# Patient Record
Sex: FEMALE | Race: WHITE | NOT HISPANIC OR LATINO | Employment: OTHER | URBAN - NONMETROPOLITAN AREA
[De-identification: names, ages, dates, MRNs, and addresses within clinical notes are randomized per-mention and may not be internally consistent; named-entity substitution may affect disease eponyms.]

---

## 2023-01-25 PROBLEM — J01.90 ACUTE SINUSITIS: Status: ACTIVE | Noted: 2023-01-25

## 2023-01-25 PROBLEM — N39.0 URINARY TRACT INFECTION: Status: ACTIVE | Noted: 2023-01-25

## 2023-01-25 PROBLEM — R10.13 ABDOMINAL PAIN, EPIGASTRIC: Status: ACTIVE | Noted: 2023-01-25

## 2023-01-25 PROBLEM — E78.00 HYPERCHOLESTEROLEMIA: Status: ACTIVE | Noted: 2023-01-25

## 2023-01-25 PROBLEM — D51.0 VITAMIN B12 DEFICIENCY ANEMIA DUE TO INTRINSIC FACTOR DEFICIENCY: Status: ACTIVE | Noted: 2023-01-25

## 2023-01-25 PROBLEM — F41.9 ANXIETY DISORDER: Status: ACTIVE | Noted: 2023-01-25

## 2023-01-25 PROBLEM — M79.605 BILATERAL LEG AND FOOT PAIN: Status: ACTIVE | Noted: 2023-01-25

## 2023-01-25 PROBLEM — M85.80 OSTEOPENIA: Status: ACTIVE | Noted: 2023-01-25

## 2023-01-25 PROBLEM — J02.9 ACUTE PHARYNGITIS: Status: ACTIVE | Noted: 2023-01-25

## 2023-01-25 PROBLEM — M81.0 OSTEOPOROSIS: Status: ACTIVE | Noted: 2023-01-25

## 2023-01-25 PROBLEM — M79.672 BILATERAL LEG AND FOOT PAIN: Status: ACTIVE | Noted: 2023-01-25

## 2023-01-25 PROBLEM — K62.89 RECTAL PAIN: Status: ACTIVE | Noted: 2023-01-25

## 2023-01-25 PROBLEM — T78.40XA ALLERGY: Status: ACTIVE | Noted: 2023-01-25

## 2023-01-25 PROBLEM — Z98.890 H/O OVARIAN CYSTECTOMY: Status: ACTIVE | Noted: 2023-01-25

## 2023-01-25 PROBLEM — K63.5 HYPERPLASTIC COLON POLYP: Status: ACTIVE | Noted: 2023-01-25

## 2023-01-25 PROBLEM — N90.9 DISORDER OF VULVA: Status: ACTIVE | Noted: 2023-01-25

## 2023-01-25 PROBLEM — K57.30 DIVERTICULOSIS OF COLON WITHOUT DIVERTICULITIS: Status: ACTIVE | Noted: 2023-01-25

## 2023-01-25 PROBLEM — R35.0 URINARY FREQUENCY: Status: ACTIVE | Noted: 2023-01-25

## 2023-01-25 PROBLEM — L72.3 INFECTED SEBACEOUS CYST: Status: ACTIVE | Noted: 2023-01-25

## 2023-01-25 PROBLEM — I10 BENIGN ESSENTIAL HYPERTENSION: Status: ACTIVE | Noted: 2023-01-25

## 2023-01-25 PROBLEM — Z87.42 H/O OVARIAN CYSTECTOMY: Status: ACTIVE | Noted: 2023-01-25

## 2023-01-25 PROBLEM — K64.8 INTERNAL HEMORRHOIDS WITH COMPLICATION: Status: ACTIVE | Noted: 2023-01-25

## 2023-01-25 PROBLEM — K21.9 GERD (GASTROESOPHAGEAL REFLUX DISEASE): Status: ACTIVE | Noted: 2023-01-25

## 2023-01-25 PROBLEM — H93.19 TINNITUS: Status: ACTIVE | Noted: 2023-01-25

## 2023-01-25 PROBLEM — K82.4 GALL BLADDER POLYP: Status: ACTIVE | Noted: 2023-01-25

## 2023-01-25 PROBLEM — K20.90 ESOPHAGITIS: Status: ACTIVE | Noted: 2023-01-25

## 2023-01-25 PROBLEM — R07.9 CHEST PAIN: Status: ACTIVE | Noted: 2023-01-25

## 2023-01-25 PROBLEM — M25.512 LEFT SHOULDER PAIN: Status: ACTIVE | Noted: 2023-01-25

## 2023-01-25 PROBLEM — M75.102 LEFT ROTATOR CUFF TEAR: Status: ACTIVE | Noted: 2023-01-25

## 2023-01-25 PROBLEM — E03.9 HYPOTHYROIDISM: Status: ACTIVE | Noted: 2023-01-25

## 2023-01-25 PROBLEM — M79.604 BILATERAL LEG AND FOOT PAIN: Status: ACTIVE | Noted: 2023-01-25

## 2023-01-25 PROBLEM — K64.4 RESIDUAL HEMORRHOIDAL SKIN TAGS: Status: ACTIVE | Noted: 2023-01-25

## 2023-01-25 PROBLEM — M21.619 ASYMPTOMATIC BUNION: Status: ACTIVE | Noted: 2023-01-25

## 2023-01-25 PROBLEM — B37.31 VULVOVAGINAL CANDIDIASIS: Status: ACTIVE | Noted: 2023-01-25

## 2023-01-25 PROBLEM — K22.89 ESOPHAGEAL THICKENING: Status: ACTIVE | Noted: 2023-01-25

## 2023-01-25 PROBLEM — E11.9 CONTROLLED DIABETES MELLITUS (MULTI): Status: ACTIVE | Noted: 2023-01-25

## 2023-01-25 PROBLEM — L30.9 DERMATITIS, ECZEMATOID: Status: ACTIVE | Noted: 2023-01-25

## 2023-01-25 PROBLEM — L98.9 SKIN LESION OF BACK: Status: ACTIVE | Noted: 2023-01-25

## 2023-01-25 PROBLEM — M70.61 GREATER TROCHANTERIC BURSITIS OF RIGHT HIP: Status: ACTIVE | Noted: 2023-01-25

## 2023-01-25 PROBLEM — F17.200 NICOTINE DEPENDENCE: Status: ACTIVE | Noted: 2023-01-25

## 2023-01-25 PROBLEM — R10.32 ABDOMINAL PAIN, LLQ (LEFT LOWER QUADRANT): Status: ACTIVE | Noted: 2023-01-25

## 2023-01-25 PROBLEM — J30.9 ALLERGIC RHINITIS: Status: ACTIVE | Noted: 2023-01-25

## 2023-01-25 PROBLEM — M79.671 BILATERAL LEG AND FOOT PAIN: Status: ACTIVE | Noted: 2023-01-25

## 2023-01-25 PROBLEM — K64.4 EXTERNAL HEMORRHOIDS: Status: ACTIVE | Noted: 2023-01-25

## 2023-01-25 PROBLEM — M25.551 HIP PAIN, RIGHT: Status: ACTIVE | Noted: 2023-01-25

## 2023-01-25 PROBLEM — R93.89 ENDOMETRIAL THICKENING ON ULTRASOUND: Status: ACTIVE | Noted: 2023-01-25

## 2023-01-25 PROBLEM — R92.8 ABNORMAL MAMMOGRAM: Status: ACTIVE | Noted: 2023-01-25

## 2023-01-25 PROBLEM — E55.9 VITAMIN D DEFICIENCY: Status: ACTIVE | Noted: 2023-01-25

## 2023-01-25 PROBLEM — N23 KIDNEY PAIN: Status: ACTIVE | Noted: 2023-01-25

## 2023-01-25 PROBLEM — L08.9 INFECTED SEBACEOUS CYST: Status: ACTIVE | Noted: 2023-01-25

## 2023-01-25 PROBLEM — R13.13 CRICOPHARYNGEAL DYSPHAGIA: Status: ACTIVE | Noted: 2023-01-25

## 2023-01-25 PROBLEM — N63.20 LEFT BREAST MASS: Status: ACTIVE | Noted: 2023-01-25

## 2023-01-25 PROBLEM — J20.9 ACUTE BRONCHITIS: Status: ACTIVE | Noted: 2023-01-25

## 2023-01-25 PROBLEM — J02.0 STREP THROAT: Status: ACTIVE | Noted: 2023-01-25

## 2023-01-25 PROBLEM — R91.1 LUNG NODULE SEEN ON IMAGING STUDY: Status: ACTIVE | Noted: 2023-01-25

## 2023-01-25 PROBLEM — R93.5 ABNORMAL ULTRASOUND OF ABDOMEN: Status: ACTIVE | Noted: 2023-01-25

## 2023-01-25 PROBLEM — T50.905A ADVERSE REACTION TO DRUG, INITIAL ENCOUNTER: Status: ACTIVE | Noted: 2023-01-25

## 2023-01-25 PROBLEM — M19.90 ARTHRITIS: Status: ACTIVE | Noted: 2023-01-25

## 2023-01-25 PROBLEM — R51.9 HEADACHE: Status: ACTIVE | Noted: 2023-01-25
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
## 2023-01-25 RX ORDER — ACETAMINOPHEN 500 MG
1 TABLET ORAL DAILY
COMMUNITY
Start: 2022-02-10

## 2023-01-25 RX ORDER — LEVOTHYROXINE SODIUM 50 UG/1
1 TABLET ORAL DAILY
COMMUNITY
Start: 2018-07-19 | End: 2023-03-09 | Stop reason: SDUPTHER

## 2023-01-25 RX ORDER — BLOOD-GLUCOSE METER
KIT MISCELLANEOUS
COMMUNITY
Start: 2014-08-18 | End: 2024-05-29

## 2023-01-25 RX ORDER — LOSARTAN POTASSIUM 25 MG/1
1 TABLET ORAL DAILY
COMMUNITY
Start: 2015-04-30 | End: 2023-03-09 | Stop reason: SDUPTHER

## 2023-01-25 RX ORDER — MULTIVITAMIN
1 TABLET ORAL DAILY
COMMUNITY
Start: 2021-11-11

## 2023-01-25 RX ORDER — METFORMIN HYDROCHLORIDE 500 MG/1
1 TABLET ORAL DAILY
COMMUNITY
Start: 2012-04-26 | End: 2023-03-09 | Stop reason: SDUPTHER

## 2023-03-09 ENCOUNTER — OFFICE VISIT (OUTPATIENT)
Dept: PRIMARY CARE | Facility: CLINIC | Age: 78
End: 2023-03-09
Payer: MEDICARE

## 2023-03-09 VITALS
HEART RATE: 75 BPM | SYSTOLIC BLOOD PRESSURE: 146 MMHG | DIASTOLIC BLOOD PRESSURE: 60 MMHG | OXYGEN SATURATION: 98 % | WEIGHT: 149.4 LBS | BODY MASS INDEX: 27.33 KG/M2

## 2023-03-09 DIAGNOSIS — E11.9 CONTROLLED TYPE 2 DIABETES MELLITUS WITHOUT COMPLICATION, WITHOUT LONG-TERM CURRENT USE OF INSULIN (MULTI): Primary | ICD-10-CM

## 2023-03-09 DIAGNOSIS — E03.8 HYPOTHYROIDISM DUE TO HASHIMOTO'S THYROIDITIS: ICD-10-CM

## 2023-03-09 DIAGNOSIS — E06.3 HYPOTHYROIDISM DUE TO HASHIMOTO'S THYROIDITIS: ICD-10-CM

## 2023-03-09 DIAGNOSIS — E55.9 VITAMIN D DEFICIENCY: ICD-10-CM

## 2023-03-09 DIAGNOSIS — I10 BENIGN ESSENTIAL HYPERTENSION: ICD-10-CM

## 2023-03-09 PROCEDURE — 3078F DIAST BP <80 MM HG: CPT | Performed by: INTERNAL MEDICINE

## 2023-03-09 PROCEDURE — 99214 OFFICE O/P EST MOD 30 MIN: CPT | Performed by: INTERNAL MEDICINE

## 2023-03-09 PROCEDURE — 1160F RVW MEDS BY RX/DR IN RCRD: CPT | Performed by: INTERNAL MEDICINE

## 2023-03-09 PROCEDURE — 1159F MED LIST DOCD IN RCRD: CPT | Performed by: INTERNAL MEDICINE

## 2023-03-09 PROCEDURE — 3077F SYST BP >= 140 MM HG: CPT | Performed by: INTERNAL MEDICINE

## 2023-03-09 RX ORDER — LOSARTAN POTASSIUM 25 MG/1
25 TABLET ORAL DAILY
Qty: 90 TABLET | Refills: 0 | Status: SHIPPED | OUTPATIENT
Start: 2023-03-09 | End: 2023-06-12 | Stop reason: SDUPTHER

## 2023-03-09 RX ORDER — LEVOTHYROXINE SODIUM 50 UG/1
50 TABLET ORAL
Qty: 90 TABLET | Refills: 0 | Status: SHIPPED | OUTPATIENT
Start: 2023-03-09 | End: 2023-06-12 | Stop reason: SDUPTHER

## 2023-03-09 RX ORDER — METFORMIN HYDROCHLORIDE 500 MG/1
500 TABLET ORAL
Qty: 90 TABLET | Refills: 0 | Status: SHIPPED | OUTPATIENT
Start: 2023-03-09 | End: 2023-06-12 | Stop reason: SDUPTHER

## 2023-03-09 ASSESSMENT — ENCOUNTER SYMPTOMS
DEPRESSION: 1
OCCASIONAL FEELINGS OF UNSTEADINESS: 0
LOSS OF SENSATION IN FEET: 0

## 2023-03-09 NOTE — PROGRESS NOTES
Subjective   Patient ID: Luz Elena Bloom is a 77 y.o. female who presents for Follow-up (3 month medical management ).  HPI  Hypertension stable on rx no side effects  DM stable on rx no side effects   yesterday am  Hypothyroidism stable on rx no side effects  Vit  D stable on rx no side effects  Diet / exercise rev'd    Review of Systems   All other systems reviewed and are negative.      Objective   Physical Exam  Vitals reviewed.   Constitutional:       General: She is not in acute distress.     Appearance: Normal appearance. She is well-developed. She is not diaphoretic.   HENT:      Head: Normocephalic and atraumatic.      Nose: Nose normal.   Eyes:      General: No scleral icterus.     Pupils: Pupils are equal, round, and reactive to light.   Neck:      Thyroid: No thyromegaly.      Vascular: No JVD.   Cardiovascular:      Rate and Rhythm: Normal rate and regular rhythm.      Heart sounds: Normal heart sounds. No murmur heard.     No friction rub. No gallop.   Pulmonary:      Effort: Pulmonary effort is normal. No respiratory distress.      Breath sounds: Normal breath sounds. No wheezing or rales.   Chest:      Chest wall: No tenderness.   Abdominal:      General: Bowel sounds are normal. There is no distension.      Palpations: Abdomen is soft. There is no mass.      Tenderness: There is no abdominal tenderness. There is no rebound.   Musculoskeletal:         General: Normal range of motion.      Cervical back: Normal range of motion and neck supple.   Lymphadenopathy:      Cervical: No cervical adenopathy.   Skin:     General: Skin is warm and dry.   Neurological:      Mental Status: She is alert and oriented to person, place, and time.      Deep Tendon Reflexes: Reflexes normal.         Assessment/Plan   Problem List Items Addressed This Visit          Circulatory    Benign essential hypertension     Stable on rx no side effects         Relevant Medications    losartan (Cozaar) 25 mg tablet        Endocrine/Metabolic    Controlled diabetes mellitus (CMS/HCC) - Primary     Check blood sugars daily  Continue meds as are           Relevant Medications    metFORMIN (Glucophage) 500 mg tablet    Hypothyroidism     Stable on rx no side effects         Relevant Medications    levothyroxine (Synthroid, Levoxyl) 50 mcg tablet    Vitamin D deficiency     Stable on rx no side effects             Follow up 3 months

## 2023-04-26 ENCOUNTER — TELEPHONE (OUTPATIENT)
Dept: PRIMARY CARE | Facility: CLINIC | Age: 78
End: 2023-04-26
Payer: MEDICARE

## 2023-04-26 NOTE — TELEPHONE ENCOUNTER
Patient called just wanting to let  know she is having a root canal tomorrow 4/27/23, she is on clindamycin 150 mg, she is just wanting to let us know in case she has a reaction.

## 2023-06-08 PROBLEM — N81.10 FEMALE BLADDER PROLAPSE: Status: ACTIVE | Noted: 2023-06-08

## 2023-06-12 ENCOUNTER — OFFICE VISIT (OUTPATIENT)
Dept: PRIMARY CARE | Facility: CLINIC | Age: 78
End: 2023-06-12
Payer: MEDICARE

## 2023-06-12 VITALS
HEART RATE: 62 BPM | DIASTOLIC BLOOD PRESSURE: 62 MMHG | OXYGEN SATURATION: 98 % | BODY MASS INDEX: 27.4 KG/M2 | SYSTOLIC BLOOD PRESSURE: 126 MMHG | WEIGHT: 149.8 LBS

## 2023-06-12 DIAGNOSIS — E11.9 CONTROLLED TYPE 2 DIABETES MELLITUS WITHOUT COMPLICATION, WITHOUT LONG-TERM CURRENT USE OF INSULIN (MULTI): Primary | ICD-10-CM

## 2023-06-12 DIAGNOSIS — E03.8 HYPOTHYROIDISM DUE TO HASHIMOTO'S THYROIDITIS: ICD-10-CM

## 2023-06-12 DIAGNOSIS — Z12.31 ENCOUNTER FOR SCREENING MAMMOGRAM FOR MALIGNANT NEOPLASM OF BREAST: ICD-10-CM

## 2023-06-12 DIAGNOSIS — I10 BENIGN ESSENTIAL HYPERTENSION: ICD-10-CM

## 2023-06-12 DIAGNOSIS — E06.3 HYPOTHYROIDISM DUE TO HASHIMOTO'S THYROIDITIS: ICD-10-CM

## 2023-06-12 DIAGNOSIS — F17.210 CIGARETTE NICOTINE DEPENDENCE WITHOUT COMPLICATION: ICD-10-CM

## 2023-06-12 PROCEDURE — 3074F SYST BP LT 130 MM HG: CPT | Performed by: INTERNAL MEDICINE

## 2023-06-12 PROCEDURE — 1159F MED LIST DOCD IN RCRD: CPT | Performed by: INTERNAL MEDICINE

## 2023-06-12 PROCEDURE — 3078F DIAST BP <80 MM HG: CPT | Performed by: INTERNAL MEDICINE

## 2023-06-12 PROCEDURE — 1160F RVW MEDS BY RX/DR IN RCRD: CPT | Performed by: INTERNAL MEDICINE

## 2023-06-12 PROCEDURE — 99214 OFFICE O/P EST MOD 30 MIN: CPT | Performed by: INTERNAL MEDICINE

## 2023-06-12 RX ORDER — METFORMIN HYDROCHLORIDE 500 MG/1
500 TABLET ORAL
Qty: 90 TABLET | Refills: 0 | Status: SHIPPED | OUTPATIENT
Start: 2023-06-12 | End: 2023-09-12 | Stop reason: SDUPTHER

## 2023-06-12 RX ORDER — LEVOTHYROXINE SODIUM 50 UG/1
50 TABLET ORAL
Qty: 90 TABLET | Refills: 0 | Status: SHIPPED | OUTPATIENT
Start: 2023-06-12 | End: 2023-09-12 | Stop reason: SDUPTHER

## 2023-06-12 RX ORDER — LOSARTAN POTASSIUM 25 MG/1
25 TABLET ORAL DAILY
Qty: 90 TABLET | Refills: 0 | Status: SHIPPED | OUTPATIENT
Start: 2023-06-12 | End: 2023-09-12 | Stop reason: SDUPTHER

## 2023-06-12 NOTE — PROGRESS NOTES
Subjective   Patient ID: Luz Elena Bloom is a 77 y.o. female who presents for Diabetes Mellitus, Hypothyroidism, and Vitamin D Deficiency.  Vitamin Deficiency    Routine follow up    No complaints    DM type II on therapy stable no side effects   this a.m.    Hypertension stable on therapy no side effects    Hypothyroidism stable on therapy no side effects    Vitamin D deficiency stable on therapy no side effects    Diet and exercise reviewed    Review of Systems   All other systems reviewed and are negative.      Objective   /62   Pulse 62   Wt 67.9 kg (149 lb 12.8 oz)   SpO2 98%   BMI 27.40 kg/m²   Lab Results   Component Value Date    WBC 7.0 11/03/2022    HGB 13.8 11/03/2022    HCT 41.4 11/03/2022     11/03/2022    CHOL 178 11/03/2022    TRIG 94 11/03/2022    HDL 54.0 11/03/2022    ALT 9 11/03/2022    AST 12 11/03/2022     11/03/2022    K 4.1 11/03/2022     (H) 11/03/2022    CREATININE 0.79 11/03/2022    BUN 18 11/03/2022    CO2 26 11/03/2022    TSH 2.97 11/03/2022    HGBA1C 6.6 (A) 11/03/2022           Physical Exam  Vitals reviewed.   Constitutional:       Appearance: Normal appearance. She is normal weight.   HENT:      Head: Normocephalic and atraumatic.      Mouth/Throat:      Pharynx: No posterior oropharyngeal erythema.   Eyes:      General: No scleral icterus.     Conjunctiva/sclera: Conjunctivae normal.      Pupils: Pupils are equal, round, and reactive to light.   Cardiovascular:      Rate and Rhythm: Normal rate and regular rhythm.      Heart sounds: Normal heart sounds.   Pulmonary:      Effort: No respiratory distress.      Breath sounds: No wheezing.   Abdominal:      General: Abdomen is flat. Bowel sounds are normal. There is no distension.      Palpations: Abdomen is soft. There is no mass.      Tenderness: There is no abdominal tenderness. There is no rebound.   Musculoskeletal:         General: Normal range of motion.      Cervical back: Normal range of  motion and neck supple.   Skin:     General: Skin is warm and dry.   Neurological:      General: No focal deficit present.      Mental Status: She is alert and oriented to person, place, and time. Mental status is at baseline.   Psychiatric:         Mood and Affect: Mood normal.         Behavior: Behavior normal.         Thought Content: Thought content normal.         Judgment: Judgment normal.       Problem List Items Addressed This Visit          Circulatory    Benign essential hypertension    Relevant Medications    losartan (Cozaar) 25 mg tablet       Endocrine/Metabolic    Controlled diabetes mellitus (CMS/HCC)    Relevant Medications    metFORMIN (Glucophage) 500 mg tablet    Hypothyroidism    Relevant Medications    levothyroxine (Synthroid, Levoxyl) 50 mcg tablet     Other Visit Diagnoses       Encounter for screening mammogram for malignant neoplasm of breast    -  Primary    Relevant Orders    BI mammo bilateral screening tomosynthesis          Assessment/Plan     DM type II on therapy stable no side effects   this a.m.    Hypertension stable on therapy no side effects    Hypothyroidism stable on therapy no side effects    Vitamin D deficiency stable on therapy no side effects    Nicotine addiction discussed    Diet and exercise reviewed    Mammogram ordered    Medicare physical 12-22    Follow up 3 months

## 2023-09-06 RX ORDER — DOXYCYCLINE 100 MG/1
100 CAPSULE ORAL 2 TIMES DAILY
COMMUNITY
Start: 2023-04-10 | End: 2023-09-12 | Stop reason: ALTCHOICE

## 2023-09-12 ENCOUNTER — OFFICE VISIT (OUTPATIENT)
Dept: PRIMARY CARE | Facility: CLINIC | Age: 78
End: 2023-09-12
Payer: MEDICARE

## 2023-09-12 VITALS
DIASTOLIC BLOOD PRESSURE: 62 MMHG | WEIGHT: 151 LBS | OXYGEN SATURATION: 98 % | SYSTOLIC BLOOD PRESSURE: 136 MMHG | HEART RATE: 75 BPM | BODY MASS INDEX: 27.62 KG/M2

## 2023-09-12 DIAGNOSIS — R53.83 OTHER FATIGUE: ICD-10-CM

## 2023-09-12 DIAGNOSIS — E06.3 HYPOTHYROIDISM DUE TO HASHIMOTO'S THYROIDITIS: ICD-10-CM

## 2023-09-12 DIAGNOSIS — E78.00 HYPERCHOLESTEREMIA: ICD-10-CM

## 2023-09-12 DIAGNOSIS — E55.9 VITAMIN D DEFICIENCY: ICD-10-CM

## 2023-09-12 DIAGNOSIS — M25.511 ACUTE PAIN OF RIGHT SHOULDER: ICD-10-CM

## 2023-09-12 DIAGNOSIS — F17.210 CIGARETTE NICOTINE DEPENDENCE WITHOUT COMPLICATION: ICD-10-CM

## 2023-09-12 DIAGNOSIS — E03.8 HYPOTHYROIDISM DUE TO HASHIMOTO'S THYROIDITIS: ICD-10-CM
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
## 2023-09-12 DIAGNOSIS — E11.9 CONTROLLED TYPE 2 DIABETES MELLITUS WITHOUT COMPLICATION, WITHOUT LONG-TERM CURRENT USE OF INSULIN (MULTI): ICD-10-CM

## 2023-09-12 DIAGNOSIS — I10 BENIGN ESSENTIAL HYPERTENSION: ICD-10-CM

## 2023-09-12 PROCEDURE — 3078F DIAST BP <80 MM HG: CPT | Performed by: INTERNAL MEDICINE

## 2023-09-12 PROCEDURE — 3075F SYST BP GE 130 - 139MM HG: CPT | Performed by: INTERNAL MEDICINE

## 2023-09-12 PROCEDURE — 99406 BEHAV CHNG SMOKING 3-10 MIN: CPT | Performed by: INTERNAL MEDICINE

## 2023-09-12 PROCEDURE — 1160F RVW MEDS BY RX/DR IN RCRD: CPT | Performed by: INTERNAL MEDICINE

## 2023-09-12 PROCEDURE — 99215 OFFICE O/P EST HI 40 MIN: CPT | Performed by: INTERNAL MEDICINE

## 2023-09-12 PROCEDURE — 1159F MED LIST DOCD IN RCRD: CPT | Performed by: INTERNAL MEDICINE

## 2023-09-12 RX ORDER — METFORMIN HYDROCHLORIDE 500 MG/1
500 TABLET ORAL
Qty: 90 TABLET | Refills: 0 | Status: SHIPPED | OUTPATIENT
Start: 2023-09-12 | End: 2023-12-26 | Stop reason: SDUPTHER

## 2023-09-12 RX ORDER — LEVOTHYROXINE SODIUM 50 UG/1
50 TABLET ORAL
Qty: 90 TABLET | Refills: 0 | Status: SHIPPED | OUTPATIENT
Start: 2023-09-12 | End: 2023-12-26 | Stop reason: SDUPTHER

## 2023-09-12 RX ORDER — LOSARTAN POTASSIUM 25 MG/1
25 TABLET ORAL DAILY
Qty: 90 TABLET | Refills: 0 | Status: SHIPPED | OUTPATIENT
Start: 2023-09-12 | End: 2023-12-26 | Stop reason: SDUPTHER

## 2023-09-12 ASSESSMENT — ENCOUNTER SYMPTOMS
OCCASIONAL FEELINGS OF UNSTEADINESS: 0
DEPRESSION: 0
LOSS OF SENSATION IN FEET: 0

## 2023-09-12 NOTE — PATIENT INSTRUCTIONS

## 2023-09-12 NOTE — PROGRESS NOTES
Subjective   Patient ID: Luz Elena Bloom is a 77 y.o. female who presents for Med Management, Diabetes Mellitus, Hypothyroidism, Vitamin D Deficiency, Fall (8-, fell at the last step and missed it, fell on right shoulder), and Shoulder Pain (Right side, Hurts to lift, pain depends on what way she turns it.  Pain goes into elbow and sometimes her hand.).  Vitamin Deficiency  Fall    Shoulder Pain     Routine follow up    Fall going to basement carrying things, not using railing and in the dark  Rt shoulder bruised FROM    DM type II on therapy stable no side effects   yesterday a.m.     Hypertension stable on therapy no side effects     Hypothyroidism stable on therapy no side effects     Vitamin D deficiency stable on therapy no side effects     Nicotine addiction discussed     Diet and exercise reviewed     Mammogram ordered     Medicare physical 12-22    Review of Systems   All other systems reviewed and are negative.      Objective   /62   Pulse 75   Wt 68.5 kg (151 lb)   SpO2 98%   BMI 27.62 kg/m²   Lab Results   Component Value Date    WBC 7.0 11/03/2022    HGB 13.8 11/03/2022    HCT 41.4 11/03/2022     11/03/2022    CHOL 178 11/03/2022    TRIG 94 11/03/2022    HDL 54.0 11/03/2022    ALT 9 11/03/2022    AST 12 11/03/2022     11/03/2022    K 4.1 11/03/2022     (H) 11/03/2022    CREATININE 0.79 11/03/2022    BUN 18 11/03/2022    CO2 26 11/03/2022    TSH 2.97 11/03/2022    HGBA1C 6.6 (A) 11/03/2022           Physical Exam  Vitals reviewed.   Constitutional:       Appearance: Normal appearance. She is normal weight.   HENT:      Head: Normocephalic and atraumatic.      Mouth/Throat:      Pharynx: No posterior oropharyngeal erythema.   Eyes:      General: No scleral icterus.     Conjunctiva/sclera: Conjunctivae normal.      Pupils: Pupils are equal, round, and reactive to light.   Cardiovascular:      Rate and Rhythm: Normal rate and regular rhythm.      Heart sounds:  Normal heart sounds.   Pulmonary:      Effort: No respiratory distress.      Breath sounds: No wheezing.   Abdominal:      General: Abdomen is flat. Bowel sounds are normal. There is no distension.      Palpations: Abdomen is soft. There is no mass.      Tenderness: There is no abdominal tenderness. There is no rebound.   Musculoskeletal:         General: Normal range of motion.      Cervical back: Normal range of motion and neck supple.   Skin:     General: Skin is warm and dry.   Neurological:      General: No focal deficit present.      Mental Status: She is alert and oriented to person, place, and time. Mental status is at baseline.   Psychiatric:         Mood and Affect: Mood normal.         Behavior: Behavior normal.         Thought Content: Thought content normal.         Judgment: Judgment normal.         Problem List Items Addressed This Visit          Cardiac and Vasculature    Benign essential hypertension    Relevant Medications    losartan (Cozaar) 25 mg tablet       Endocrine/Metabolic    Controlled diabetes mellitus (CMS/HCC)    Relevant Medications    metFORMIN (Glucophage) 500 mg tablet    Other Relevant Orders    Hemoglobin A1C    Albumin , Urine Random    Hypothyroidism    Relevant Medications    levothyroxine (Synthroid, Levoxyl) 50 mcg tablet    Vitamin D deficiency    Relevant Orders    Vitamin D 25-Hydroxy,Total (for eval of Vitamin D levels)       Musculoskeletal and Injuries    Fall - Primary       Tobacco    Nicotine dependence     Other Visit Diagnoses       Acute pain of right shoulder        Hypercholesteremia        Relevant Orders    Comprehensive Metabolic Panel    Lipid Panel    TSH with reflex to Free T4 if abnormal    Other fatigue        Relevant Orders    CBC and Auto Differential    BMI 26.0-26.9,adult              Assessment/Plan       Routine follow up    Fall going to basement carrying things, not using railing and in the dark  Rt shoulder bruised FROM  Follow  Pt declined  PT    DM type II on therapy stable no side effects   yesterday a.m.     Hypertension stable on therapy no side effects     Hypothyroidism stable on therapy no side effects     Vitamin D deficiency stable on therapy no side effects    smoking  cessation discussed   I spent more  than 3 min but less than 10 min counselling pt about need for smoking / tobacco cessation  and how I can support efforts when pt is ready to quit      Diet and exercise reviewed     Mammogram ordered     Medicare physical 12-22    Mammogram 7-22 ordered not done  Dexa n/a  Colonoscopy n/a  CT chest lung cancer screening on follow up  GYN n/a  immunizations rev'd  BMI 26.3    Patient was identified as a fall risk. Risk prevention instructions provided    Check labs , urine before appt  Do mammogram    Eyes, feet on follow up    Follow up 3 months / medicare physical 12-23 / EKG

## 2023-12-04 ENCOUNTER — LAB (OUTPATIENT)
Dept: LAB | Facility: LAB | Age: 78
End: 2023-12-04
Payer: MEDICARE

## 2023-12-04 DIAGNOSIS — R53.83 OTHER FATIGUE: ICD-10-CM

## 2023-12-04 DIAGNOSIS — E78.00 HYPERCHOLESTEREMIA: ICD-10-CM

## 2023-12-04 DIAGNOSIS — E11.9 CONTROLLED TYPE 2 DIABETES MELLITUS WITHOUT COMPLICATION, WITHOUT LONG-TERM CURRENT USE OF INSULIN (MULTI): ICD-10-CM

## 2023-12-04 DIAGNOSIS — E55.9 VITAMIN D DEFICIENCY: ICD-10-CM

## 2023-12-04 LAB
25(OH)D3 SERPL-MCNC: 48 NG/ML (ref 30–100)
ALBUMIN SERPL BCP-MCNC: 3.9 G/DL (ref 3.4–5)
ALP SERPL-CCNC: 57 U/L (ref 33–136)
ALT SERPL W P-5'-P-CCNC: 14 U/L (ref 7–45)
ANION GAP SERPL CALC-SCNC: 13 MMOL/L (ref 10–20)
AST SERPL W P-5'-P-CCNC: 15 U/L (ref 9–39)
BASOPHILS # BLD AUTO: 0.04 X10*3/UL (ref 0–0.1)
BASOPHILS NFR BLD AUTO: 0.6 %
BILIRUB SERPL-MCNC: 0.3 MG/DL (ref 0–1.2)
BUN SERPL-MCNC: 18 MG/DL (ref 6–23)
CALCIUM SERPL-MCNC: 9.1 MG/DL (ref 8.6–10.3)
CHLORIDE SERPL-SCNC: 107 MMOL/L (ref 98–107)
CHOLEST SERPL-MCNC: 190 MG/DL (ref 0–199)
CHOLESTEROL/HDL RATIO: 3.8
CO2 SERPL-SCNC: 24 MMOL/L (ref 21–32)
CREAT SERPL-MCNC: 0.83 MG/DL (ref 0.5–1.05)
CREAT UR-MCNC: 141.3 MG/DL (ref 20–320)
EOSINOPHIL # BLD AUTO: 0.04 X10*3/UL (ref 0–0.4)
EOSINOPHIL NFR BLD AUTO: 0.6 %
ERYTHROCYTE [DISTWIDTH] IN BLOOD BY AUTOMATED COUNT: 13.4 % (ref 11.5–14.5)
EST. AVERAGE GLUCOSE BLD GHB EST-MCNC: 151 MG/DL
GFR SERPL CREATININE-BSD FRML MDRD: 73 ML/MIN/1.73M*2
GLUCOSE SERPL-MCNC: 174 MG/DL (ref 74–99)
HBA1C MFR BLD: 6.9 %
HCT VFR BLD AUTO: 40.8 % (ref 36–46)
HDLC SERPL-MCNC: 49.6 MG/DL
HGB BLD-MCNC: 13.5 G/DL (ref 12–16)
IMM GRANULOCYTES # BLD AUTO: 0.01 X10*3/UL (ref 0–0.5)
IMM GRANULOCYTES NFR BLD AUTO: 0.2 % (ref 0–0.9)
LDLC SERPL CALC-MCNC: 117 MG/DL
LYMPHOCYTES # BLD AUTO: 1.67 X10*3/UL (ref 0.8–3)
LYMPHOCYTES NFR BLD AUTO: 27 %
MCH RBC QN AUTO: 32.5 PG (ref 26–34)
MCHC RBC AUTO-ENTMCNC: 33.1 G/DL (ref 32–36)
MCV RBC AUTO: 98 FL (ref 80–100)
MICROALBUMIN UR-MCNC: 19.9 MG/L
MICROALBUMIN/CREAT UR: 14.1 UG/MG CREAT
MONOCYTES # BLD AUTO: 0.77 X10*3/UL (ref 0.05–0.8)
MONOCYTES NFR BLD AUTO: 12.4 %
NEUTROPHILS # BLD AUTO: 3.66 X10*3/UL (ref 1.6–5.5)
NEUTROPHILS NFR BLD AUTO: 59.2 %
NON HDL CHOLESTEROL: 140 MG/DL (ref 0–149)
NRBC BLD-RTO: 0 /100 WBCS (ref 0–0)
PLATELET # BLD AUTO: 200 X10*3/UL (ref 150–450)
POTASSIUM SERPL-SCNC: 4.2 MMOL/L (ref 3.5–5.3)
PROT SERPL-MCNC: 6.7 G/DL (ref 6.4–8.2)
RBC # BLD AUTO: 4.15 X10*6/UL (ref 4–5.2)
SODIUM SERPL-SCNC: 140 MMOL/L (ref 136–145)
TRIGL SERPL-MCNC: 117 MG/DL (ref 0–149)
TSH SERPL-ACNC: 3.67 MIU/L (ref 0.44–3.98)
VLDL: 23 MG/DL (ref 0–40)
WBC # BLD AUTO: 6.2 X10*3/UL (ref 4.4–11.3)

## 2023-12-04 PROCEDURE — 82043 UR ALBUMIN QUANTITATIVE: CPT

## 2023-12-04 PROCEDURE — 82306 VITAMIN D 25 HYDROXY: CPT

## 2023-12-04 PROCEDURE — 82570 ASSAY OF URINE CREATININE: CPT

## 2023-12-04 PROCEDURE — 36415 COLL VENOUS BLD VENIPUNCTURE: CPT

## 2023-12-04 PROCEDURE — 83036 HEMOGLOBIN GLYCOSYLATED A1C: CPT

## 2023-12-12 ENCOUNTER — OFFICE VISIT (OUTPATIENT)
Dept: PRIMARY CARE | Facility: CLINIC | Age: 78
End: 2023-12-12
Payer: MEDICARE

## 2023-12-12 VITALS
OXYGEN SATURATION: 97 % | BODY MASS INDEX: 27.12 KG/M2 | DIASTOLIC BLOOD PRESSURE: 72 MMHG | WEIGHT: 147.4 LBS | HEART RATE: 69 BPM | SYSTOLIC BLOOD PRESSURE: 136 MMHG | HEIGHT: 62 IN

## 2023-12-12 DIAGNOSIS — E11.69 TYPE 2 DIABETES MELLITUS WITH OTHER SPECIFIED COMPLICATION, WITHOUT LONG-TERM CURRENT USE OF INSULIN (MULTI): ICD-10-CM

## 2023-12-12 DIAGNOSIS — Z71.2 ENCOUNTER TO DISCUSS TEST RESULTS: ICD-10-CM

## 2023-12-12 DIAGNOSIS — Z13.9 SCREENING DUE: ICD-10-CM

## 2023-12-12 DIAGNOSIS — I10 BENIGN ESSENTIAL HYPERTENSION: ICD-10-CM

## 2023-12-12 DIAGNOSIS — Z00.00 ENCOUNTER FOR SUBSEQUENT ANNUAL WELLNESS VISIT (AWV) IN MEDICARE PATIENT: Primary | ICD-10-CM

## 2023-12-12 DIAGNOSIS — F17.210 CIGARETTE NICOTINE DEPENDENCE WITHOUT COMPLICATION: ICD-10-CM

## 2023-12-12 PROCEDURE — 3075F SYST BP GE 130 - 139MM HG: CPT | Performed by: INTERNAL MEDICINE

## 2023-12-12 PROCEDURE — G0446 INTENS BEHAVE THER CARDIO DX: HCPCS | Performed by: INTERNAL MEDICINE

## 2023-12-12 PROCEDURE — G0439 PPPS, SUBSEQ VISIT: HCPCS | Performed by: INTERNAL MEDICINE

## 2023-12-12 PROCEDURE — 93000 ELECTROCARDIOGRAM COMPLETE: CPT | Performed by: INTERNAL MEDICINE

## 2023-12-12 PROCEDURE — 1160F RVW MEDS BY RX/DR IN RCRD: CPT | Performed by: INTERNAL MEDICINE

## 2023-12-12 PROCEDURE — 3078F DIAST BP <80 MM HG: CPT | Performed by: INTERNAL MEDICINE

## 2023-12-12 PROCEDURE — 1170F FXNL STATUS ASSESSED: CPT | Performed by: INTERNAL MEDICINE

## 2023-12-12 PROCEDURE — 1159F MED LIST DOCD IN RCRD: CPT | Performed by: INTERNAL MEDICINE

## 2023-12-12 ASSESSMENT — PATIENT HEALTH QUESTIONNAIRE - PHQ9
SUM OF ALL RESPONSES TO PHQ9 QUESTIONS 1 AND 2: 0
2. FEELING DOWN, DEPRESSED OR HOPELESS: NOT AT ALL
1. LITTLE INTEREST OR PLEASURE IN DOING THINGS: NOT AT ALL

## 2023-12-12 ASSESSMENT — ACTIVITIES OF DAILY LIVING (ADL)
MANAGING_FINANCES: INDEPENDENT
DOING_HOUSEWORK: INDEPENDENT
BATHING: INDEPENDENT
TAKING_MEDICATION: INDEPENDENT
DRESSING: INDEPENDENT
GROCERY_SHOPPING: INDEPENDENT

## 2023-12-12 ASSESSMENT — ENCOUNTER SYMPTOMS
DEPRESSION: 0
LOSS OF SENSATION IN FEET: 0
OCCASIONAL FEELINGS OF UNSTEADINESS: 0

## 2023-12-13 NOTE — PROGRESS NOTES
"Subjective   Reason for Visit: Luz Elena Bloom is an 78 y.o. female here for a Medicare Wellness visit.     Past Medical, Surgical, and Family History reviewed and updated in chart.    Reviewed all medications by prescribing practitioner or clinical pharmacist (such as prescriptions, OTCs, herbal therapies and supplements) and documented in the medical record.    HPI    Medicare wellness    Labs rev'd    Mammogram 7-23  Dexa n/a  Colonoscopy n/a  CT chest lung cancer screening n/a  GYN n/a  immunizations rev'd  BMI 26.9      Patient Care Team:  Mary Jo Brooks MD as PCP - General  [facility] Jo Brooks [facility]     Review of Systems   All other systems reviewed and are negative.      Objective   Vitals:  /72   Pulse 69   Ht 1.575 m (5' 2\")   Wt 66.9 kg (147 lb 6.4 oz)   SpO2 97%   BMI 26.96 kg/m²       Physical Exam  Vitals reviewed.   Constitutional:       Appearance: Normal appearance. She is normal weight.   HENT:      Head: Normocephalic and atraumatic.      Mouth/Throat:      Pharynx: No posterior oropharyngeal erythema.   Eyes:      General: No scleral icterus.     Conjunctiva/sclera: Conjunctivae normal.      Pupils: Pupils are equal, round, and reactive to light.   Cardiovascular:      Rate and Rhythm: Normal rate and regular rhythm.      Heart sounds: Normal heart sounds.   Pulmonary:      Effort: No respiratory distress.      Breath sounds: No wheezing.   Abdominal:      General: Abdomen is flat. Bowel sounds are normal. There is no distension.      Palpations: Abdomen is soft. There is no mass.      Tenderness: There is no abdominal tenderness. There is no rebound.   Musculoskeletal:         General: Normal range of motion.      Cervical back: Normal range of motion and neck supple.   Skin:     General: Skin is warm and dry.   Neurological:      General: No focal deficit present.      Mental Status: She is alert and oriented to person, place, and time. Mental " status is at baseline.   Psychiatric:         Mood and Affect: Mood normal.         Behavior: Behavior normal.         Thought Content: Thought content normal.         Judgment: Judgment normal.         Assessment/Plan   Problem List Items Addressed This Visit          Cardiac and Vasculature    Benign essential hypertension    Relevant Orders    ECG 12 Lead (Completed)       Tobacco    Nicotine dependence     Other Visit Diagnoses       Encounter for subsequent annual wellness visit (AWV) in Medicare patient    -  Primary    Encounter to discuss test results        Type 2 diabetes mellitus with other specified complication, without long-term current use of insulin (CMS/[facility])        Screening due        Relevant Orders    CT cardiac scoring wo IV contrast    BMI 26.0-26.9,adult                 Medicare wellness    Labs rev'd    Mammogram 7-23  Dexa n/a  Colonoscopy n/a  CT chest lung cancer screening n/a  GYN n/a  immunizations rev'd  BMI 26.9    Hypertension stable on rx no side effects  EKG ok 12-23  I spent 15 minutes face-to-face with this individual discussing their cardiovascular risk and behavioral therapies of nutritional choices, exercise, and elimination of habits contributing to risk. We agreed on plan how they may be able to reduce their current cardiovascular risk.  Patient 10 year cardiac risk estimate calculates :   59.5  %   CT cardiac score ordered    Nicotine abuse discussed    DM 2   HBA1C 6.9  12-23    Diet / exercise rev'd    Follow up 3 months

## 2023-12-26 DIAGNOSIS — E03.8 HYPOTHYROIDISM DUE TO HASHIMOTO'S THYROIDITIS: ICD-10-CM

## 2023-12-26 DIAGNOSIS — E11.9 CONTROLLED TYPE 2 DIABETES MELLITUS WITHOUT COMPLICATION, WITHOUT LONG-TERM CURRENT USE OF INSULIN (MULTI): ICD-10-CM

## 2023-12-26 DIAGNOSIS — E06.3 HYPOTHYROIDISM DUE TO HASHIMOTO'S THYROIDITIS: ICD-10-CM

## 2023-12-26 DIAGNOSIS — I10 BENIGN ESSENTIAL HYPERTENSION: ICD-10-CM

## 2023-12-27 RX ORDER — METFORMIN HYDROCHLORIDE 500 MG/1
500 TABLET ORAL
Qty: 90 TABLET | Refills: 0 | Status: SHIPPED | OUTPATIENT
Start: 2023-12-27 | End: 2024-03-25 | Stop reason: SDUPTHER

## 2023-12-27 RX ORDER — LOSARTAN POTASSIUM 25 MG/1
25 TABLET ORAL DAILY
Qty: 90 TABLET | Refills: 0 | Status: SHIPPED | OUTPATIENT
Start: 2023-12-27 | End: 2024-03-25 | Stop reason: SDUPTHER

## 2023-12-27 RX ORDER — LEVOTHYROXINE SODIUM 50 UG/1
50 TABLET ORAL
Qty: 90 TABLET | Refills: 0 | Status: SHIPPED | OUTPATIENT
Start: 2023-12-27 | End: 2024-03-25 | Stop reason: SDUPTHER

## 2024-03-13 ENCOUNTER — APPOINTMENT (OUTPATIENT)
Dept: PRIMARY CARE | Facility: CLINIC | Age: 79
End: 2024-03-13
Payer: MEDICARE

## 2024-03-25 ENCOUNTER — OFFICE VISIT (OUTPATIENT)
Dept: PRIMARY CARE | Facility: CLINIC | Age: 79
End: 2024-03-25
Payer: MEDICARE

## 2024-03-25 VITALS
TEMPERATURE: 97.6 F | DIASTOLIC BLOOD PRESSURE: 62 MMHG | OXYGEN SATURATION: 98 % | SYSTOLIC BLOOD PRESSURE: 142 MMHG | BODY MASS INDEX: 28.17 KG/M2 | HEART RATE: 105 BPM | WEIGHT: 154 LBS

## 2024-03-25 DIAGNOSIS — B35.1 ONYCHOMYCOSIS: ICD-10-CM

## 2024-03-25 DIAGNOSIS — H91.90 HEARING LOSS, UNSPECIFIED HEARING LOSS TYPE, UNSPECIFIED LATERALITY: ICD-10-CM

## 2024-03-25 DIAGNOSIS — E06.3 HYPOTHYROIDISM DUE TO HASHIMOTO'S THYROIDITIS: ICD-10-CM

## 2024-03-25 DIAGNOSIS — E03.8 HYPOTHYROIDISM DUE TO HASHIMOTO'S THYROIDITIS: ICD-10-CM

## 2024-03-25 DIAGNOSIS — E11.9 CONTROLLED TYPE 2 DIABETES MELLITUS WITHOUT COMPLICATION, WITHOUT LONG-TERM CURRENT USE OF INSULIN (MULTI): Primary | ICD-10-CM

## 2024-03-25 DIAGNOSIS — I10 BENIGN ESSENTIAL HYPERTENSION: ICD-10-CM

## 2024-03-25 DIAGNOSIS — J30.9 ALLERGIC RHINITIS, UNSPECIFIED SEASONALITY, UNSPECIFIED TRIGGER: ICD-10-CM

## 2024-03-25 PROBLEM — E11.69 TYPE 2 DIABETES MELLITUS WITH OTHER SPECIFIED COMPLICATION, WITHOUT LONG-TERM CURRENT USE OF INSULIN (MULTI): Status: ACTIVE | Noted: 2023-01-25

## 2024-03-25 PROCEDURE — 1160F RVW MEDS BY RX/DR IN RCRD: CPT | Performed by: INTERNAL MEDICINE

## 2024-03-25 PROCEDURE — 3078F DIAST BP <80 MM HG: CPT | Performed by: INTERNAL MEDICINE

## 2024-03-25 PROCEDURE — 1036F TOBACCO NON-USER: CPT | Performed by: INTERNAL MEDICINE

## 2024-03-25 PROCEDURE — 1159F MED LIST DOCD IN RCRD: CPT | Performed by: INTERNAL MEDICINE

## 2024-03-25 PROCEDURE — 99214 OFFICE O/P EST MOD 30 MIN: CPT | Performed by: INTERNAL MEDICINE

## 2024-03-25 PROCEDURE — 3077F SYST BP >= 140 MM HG: CPT | Performed by: INTERNAL MEDICINE

## 2024-03-25 RX ORDER — METFORMIN HYDROCHLORIDE 500 MG/1
500 TABLET ORAL
Qty: 90 TABLET | Refills: 0 | Status: SHIPPED | OUTPATIENT
Start: 2024-03-25 | End: 2024-06-23

## 2024-03-25 RX ORDER — CICLOPIROX 80 MG/ML
SOLUTION TOPICAL NIGHTLY
Qty: 6.6 ML | Refills: 5 | Status: SHIPPED | OUTPATIENT
Start: 2024-03-25

## 2024-03-25 RX ORDER — LOSARTAN POTASSIUM 50 MG/1
50 TABLET ORAL DAILY
Qty: 90 TABLET | Refills: 0 | Status: SHIPPED | OUTPATIENT
Start: 2024-03-25 | End: 2024-06-23

## 2024-03-25 RX ORDER — LEVOTHYROXINE SODIUM 50 UG/1
50 TABLET ORAL
Qty: 90 TABLET | Refills: 0 | Status: SHIPPED | OUTPATIENT
Start: 2024-03-25 | End: 2024-06-23

## 2024-03-25 ASSESSMENT — PATIENT HEALTH QUESTIONNAIRE - PHQ9
SUM OF ALL RESPONSES TO PHQ9 QUESTIONS 1 AND 2: 0
1. LITTLE INTEREST OR PLEASURE IN DOING THINGS: NOT AT ALL
2. FEELING DOWN, DEPRESSED OR HOPELESS: NOT AT ALL

## 2024-03-25 NOTE — PROGRESS NOTES
Subjective   Patient ID: Luz Elena Bloom is a 78 y.o. female who presents for Follow-up (3 mth. ), Foot Pain (Cramping. Looks like the big toenail is coming off on her right foot.), Hearing Problem (Trouble hearing in right ear.), and Eye Problem (Having mucus come out of them in the morning. ).  Hearing Problem    Eye Problem     Routine follow up    Onchomycosis big toe rt  Penlac as directed  Left foot bunion  Podiatry consult    Hearing loss  Cerumen right  ENT consult    Allergies  Claritin daily    Hypertension stable on rx no side effects  EKG ok 12-23  Increase cozaar 50 mg daily  CT cardiac score ordered / not done  Statin on follow up     Nicotine quit 12-23     DM 2  stable on rx no side effects  HBA1C 6.9  12-23     Diet / exercise rev'd    Review of Systems   All other systems reviewed and are negative.      Objective   /62   Pulse 105   Temp 36.4 °C (97.6 °F)   Wt 69.9 kg (154 lb)   SpO2 98%   BMI 28.17 kg/m²   Lab Results   Component Value Date    WBC 6.2 12/04/2023    HGB 13.5 12/04/2023    HCT 40.8 12/04/2023     12/04/2023    CHOL 190 12/04/2023    TRIG 117 12/04/2023    HDL 49.6 12/04/2023    ALT 14 12/04/2023    AST 15 12/04/2023     12/04/2023    K 4.2 12/04/2023     12/04/2023    CREATININE 0.83 12/04/2023    BUN 18 12/04/2023    CO2 24 12/04/2023    TSH 3.67 12/04/2023    HGBA1C 6.9 (H) 12/04/2023           Physical Exam  Vitals reviewed.   Constitutional:       Appearance: Normal appearance. She is normal weight.   HENT:      Head: Normocephalic and atraumatic.      Comments: Rt canal wax  Lt ok     Mouth/Throat:      Pharynx: No posterior oropharyngeal erythema.   Eyes:      General: No scleral icterus.     Conjunctiva/sclera: Conjunctivae normal.      Pupils: Pupils are equal, round, and reactive to light.   Cardiovascular:      Rate and Rhythm: Normal rate and regular rhythm.      Heart sounds: Normal heart sounds.   Pulmonary:      Effort: No respiratory  distress.      Breath sounds: No wheezing.   Abdominal:      General: Abdomen is flat. Bowel sounds are normal. There is no distension.      Palpations: Abdomen is soft. There is no mass.      Tenderness: There is no abdominal tenderness. There is no rebound.   Musculoskeletal:         General: Normal range of motion.      Cervical back: Normal range of motion and neck supple.   Skin:     General: Skin is warm and dry.   Neurological:      General: No focal deficit present.      Mental Status: She is alert and oriented to person, place, and time. Mental status is at baseline.   Psychiatric:         Mood and Affect: Mood normal.         Behavior: Behavior normal.         Thought Content: Thought content normal.         Judgment: Judgment normal.         Problem List Items Addressed This Visit             ICD-10-CM    Allergic rhinitis J30.9    Benign essential hypertension I10    Relevant Medications    losartan (Cozaar) 50 mg tablet    Type 2 diabetes mellitus with other specified complication, without long-term current use of insulin (CMS/[facility]) - Primary E11.69    Relevant Medications    metFORMIN (Glucophage) 500 mg tablet    Hypothyroidism E03.9    Relevant Medications    levothyroxine (Synthroid, Levoxyl) 50 mcg tablet     Other Visit Diagnoses         Codes    Onychomycosis     B35.1    Relevant Medications    ciclopirox (Penlac) 8 % solution    Hearing loss, unspecified hearing loss type, unspecified laterality     H91.90    Relevant Orders    Referral to ENT    BMI 28.0-28.9,adult     Z68.28          Assessment/Plan     Onchomycosis big toe rt  Penlac as directed  Left foot bunion  Podiatry consult    Hearing loss  Cerumen right  ENT consult    Allergies  Claritin daily    Hypertension stable on rx no side effects  EKG ok 12-23  Increase cozaar 50 mg daily  CT cardiac score ordered / not done  Statin on follow up     Nicotine quit 12-23     DM 2  stable on rx no side effects  HBA1C 6.9  12-23     Diet /  exercise rev'd    Mammogram 7-23  Dexa n/a  Colonoscopy n/a  CT chest lung cancer screening n/a  GYN n/a  immunizations rev'd  BMI 28.1    Follow up 3 months

## 2024-05-28 DIAGNOSIS — E11.69 TYPE 2 DIABETES MELLITUS WITH OTHER SPECIFIED COMPLICATION, WITHOUT LONG-TERM CURRENT USE OF INSULIN (MULTI): ICD-10-CM

## 2024-05-29 RX ORDER — BLOOD-GLUCOSE METER
KIT MISCELLANEOUS
Qty: 200 STRIP | Refills: 0 | Status: SHIPPED | OUTPATIENT
Start: 2024-05-29

## 2024-05-29 RX ORDER — LANCETS
EACH MISCELLANEOUS
Qty: 200 EACH | Refills: 0 | Status: SHIPPED | OUTPATIENT
Start: 2024-05-29 | End: 2024-06-04 | Stop reason: SDUPTHER

## 2024-06-03 DIAGNOSIS — E11.69 TYPE 2 DIABETES MELLITUS WITH OTHER SPECIFIED COMPLICATION, WITHOUT LONG-TERM CURRENT USE OF INSULIN (MULTI): ICD-10-CM

## 2024-06-04 RX ORDER — LANCETS 28 GAUGE
EACH MISCELLANEOUS
Qty: 100 EACH | Refills: 0 | Status: SHIPPED | OUTPATIENT
Start: 2024-06-04

## 2024-06-10 RX ORDER — TERBINAFINE HYDROCHLORIDE 250 MG/1
250 TABLET ORAL DAILY
COMMUNITY

## 2024-06-25 ENCOUNTER — APPOINTMENT (OUTPATIENT)
Dept: PRIMARY CARE | Facility: CLINIC | Age: 79
End: 2024-06-25
Payer: MEDICARE

## 2024-06-25 VITALS
DIASTOLIC BLOOD PRESSURE: 60 MMHG | WEIGHT: 153 LBS | SYSTOLIC BLOOD PRESSURE: 148 MMHG | TEMPERATURE: 98.2 F | OXYGEN SATURATION: 98 % | BODY MASS INDEX: 27.98 KG/M2 | HEART RATE: 51 BPM

## 2024-06-25 DIAGNOSIS — Z12.31 ENCOUNTER FOR SCREENING MAMMOGRAM FOR MALIGNANT NEOPLASM OF BREAST: ICD-10-CM

## 2024-06-25 DIAGNOSIS — E11.9 CONTROLLED TYPE 2 DIABETES MELLITUS WITHOUT COMPLICATION, WITHOUT LONG-TERM CURRENT USE OF INSULIN (MULTI): Primary | ICD-10-CM

## 2024-06-25 DIAGNOSIS — I10 BENIGN ESSENTIAL HYPERTENSION: ICD-10-CM

## 2024-06-25 DIAGNOSIS — E06.3 HYPOTHYROIDISM DUE TO HASHIMOTO'S THYROIDITIS: ICD-10-CM

## 2024-06-25 DIAGNOSIS — E03.8 HYPOTHYROIDISM DUE TO HASHIMOTO'S THYROIDITIS: ICD-10-CM

## 2024-06-25 DIAGNOSIS — B35.1 ONYCHOMYCOSIS: ICD-10-CM

## 2024-06-25 PROCEDURE — 1159F MED LIST DOCD IN RCRD: CPT | Performed by: INTERNAL MEDICINE

## 2024-06-25 PROCEDURE — 99214 OFFICE O/P EST MOD 30 MIN: CPT | Performed by: INTERNAL MEDICINE

## 2024-06-25 PROCEDURE — 1160F RVW MEDS BY RX/DR IN RCRD: CPT | Performed by: INTERNAL MEDICINE

## 2024-06-25 PROCEDURE — 3077F SYST BP >= 140 MM HG: CPT | Performed by: INTERNAL MEDICINE

## 2024-06-25 PROCEDURE — 3078F DIAST BP <80 MM HG: CPT | Performed by: INTERNAL MEDICINE

## 2024-06-25 PROCEDURE — 1036F TOBACCO NON-USER: CPT | Performed by: INTERNAL MEDICINE

## 2024-06-25 RX ORDER — LEVOTHYROXINE SODIUM 50 UG/1
50 TABLET ORAL
Qty: 90 TABLET | Refills: 0 | Status: SHIPPED | OUTPATIENT
Start: 2024-06-25 | End: 2024-09-23

## 2024-06-25 RX ORDER — LOSARTAN POTASSIUM 50 MG/1
50 TABLET ORAL DAILY
Qty: 90 TABLET | Refills: 0 | Status: SHIPPED | OUTPATIENT
Start: 2024-06-25 | End: 2024-09-23

## 2024-06-25 RX ORDER — METFORMIN HYDROCHLORIDE 500 MG/1
500 TABLET ORAL
Qty: 90 TABLET | Refills: 0 | Status: SHIPPED | OUTPATIENT
Start: 2024-06-25 | End: 2024-09-23

## 2024-06-25 NOTE — PROGRESS NOTES
Subjective   Patient ID: Luz Elena Bloom is a 78 y.o. female who presents for 3 month follow up .  HPI    Routine follow up    Onchomycosis big toe rt  Penlac as directed  Left foot bunion  Podiatry following     Hearing loss  Cerumen right  ENT consult     Allergies  Claritin daily    Hypertension stable on rx no side effects  EKG ok 12-23  cozaar 50 mg daily  CT cardiac score ordered / not done  Statin on follow up     Nicotine quit 12-23     DM 2  stable on rx no side effects  HBA1C 6.9  12-23    Hypothyroidism on rx no side effects     Diet / exercise rev'd    Review of Systems   All other systems reviewed and are negative.      Objective   /60   Pulse 51   Temp 36.8 °C (98.2 °F)   Wt 69.4 kg (153 lb)   SpO2 98%   BMI 27.98 kg/m²   Lab Results   Component Value Date    WBC 6.2 12/04/2023    HGB 13.5 12/04/2023    HCT 40.8 12/04/2023     12/04/2023    CHOL 190 12/04/2023    TRIG 117 12/04/2023    HDL 49.6 12/04/2023    ALT 14 12/04/2023    AST 15 12/04/2023     12/04/2023    K 4.2 12/04/2023     12/04/2023    CREATININE 0.83 12/04/2023    BUN 18 12/04/2023    CO2 24 12/04/2023    TSH 3.67 12/04/2023    HGBA1C 6.9 (H) 12/04/2023           Physical Exam  Vitals reviewed.   Constitutional:       Appearance: Normal appearance. She is normal weight.   HENT:      Head: Normocephalic and atraumatic.      Mouth/Throat:      Pharynx: No posterior oropharyngeal erythema.   Eyes:      General: No scleral icterus.     Conjunctiva/sclera: Conjunctivae normal.      Pupils: Pupils are equal, round, and reactive to light.   Cardiovascular:      Rate and Rhythm: Normal rate and regular rhythm.      Heart sounds: Normal heart sounds.   Pulmonary:      Effort: No respiratory distress.      Breath sounds: No wheezing.   Abdominal:      General: Abdomen is flat. Bowel sounds are normal. There is no distension.      Palpations: Abdomen is soft. There is no mass.      Tenderness: There is no abdominal  tenderness. There is no rebound.   Musculoskeletal:         General: Normal range of motion.      Cervical back: Normal range of motion and neck supple.   Skin:     General: Skin is warm and dry.   Neurological:      General: No focal deficit present.      Mental Status: She is alert and oriented to person, place, and time. Mental status is at baseline.   Psychiatric:         Mood and Affect: Mood normal.         Behavior: Behavior normal.         Thought Content: Thought content normal.         Judgment: Judgment normal.         Problem List Items Addressed This Visit             ICD-10-CM    Benign essential hypertension I10    Relevant Medications    losartan (Cozaar) 50 mg tablet    Hypothyroidism E03.9    Relevant Medications    levothyroxine (Synthroid, Levoxyl) 50 mcg tablet     Other Visit Diagnoses         Codes    Controlled type 2 diabetes mellitus without complication, without long-term current use of insulin (Multi)    -  Primary E11.9    Relevant Medications    metFORMIN (Glucophage) 500 mg tablet    Onychomycosis     B35.1    Encounter for screening mammogram for malignant neoplasm of breast     Z12.31    Relevant Orders    BI mammo bilateral screening tomosynthesis    BMI 27.0-27.9,adult     Z68.27          Assessment/Plan     Onchomycosis big toe rt  Penlac as directed  Left foot bunion  Podiatry following     Hearing loss  Cerumen right  ENT consult     Allergies  Claritin daily    Hypertension stable on rx no side effects  EKG ok 12-23  cozaar 50 mg daily  CT cardiac score ordered / not done  Statin on follow up     Nicotine quit 12-23     DM 2  stable on rx no side effects  HBA1C 6.9  12-23    Hypothyroidism on rx no side effects     Diet / exercise rev'd    Mammogram 7-23  Dexa n/a  Colonoscopy n/a  CT chest lung cancer screening n/a  GYN n/a  immunizations rev'd  BMI 27.9    Check mammogram    Follow up 3 months

## 2024-07-09 ENCOUNTER — APPOINTMENT (OUTPATIENT)
Dept: OTOLARYNGOLOGY | Facility: CLINIC | Age: 79
End: 2024-07-09
Payer: MEDICARE

## 2024-07-09 ENCOUNTER — APPOINTMENT (OUTPATIENT)
Dept: AUDIOLOGY | Facility: CLINIC | Age: 79
End: 2024-07-09
Payer: MEDICARE

## 2024-07-22 ENCOUNTER — HOSPITAL ENCOUNTER (OUTPATIENT)
Dept: RADIOLOGY | Facility: HOSPITAL | Age: 79
Discharge: HOME | End: 2024-07-22
Payer: MEDICARE

## 2024-07-22 VITALS — BODY MASS INDEX: 27.31 KG/M2 | HEIGHT: 64 IN | WEIGHT: 160 LBS

## 2024-07-22 DIAGNOSIS — Z12.31 ENCOUNTER FOR SCREENING MAMMOGRAM FOR MALIGNANT NEOPLASM OF BREAST: ICD-10-CM

## 2024-07-22 PROCEDURE — 77063 BREAST TOMOSYNTHESIS BI: CPT | Performed by: RADIOLOGY

## 2024-07-22 PROCEDURE — 77067 SCR MAMMO BI INCL CAD: CPT | Performed by: RADIOLOGY

## 2024-07-22 PROCEDURE — 77067 SCR MAMMO BI INCL CAD: CPT

## 2024-07-23 NOTE — RESULT ENCOUNTER NOTE
Please call the patient regarding her abnormal result.  Needs to call radiology asap for further imaging

## 2024-08-09 ENCOUNTER — HOSPITAL ENCOUNTER (OUTPATIENT)
Dept: RADIOLOGY | Facility: HOSPITAL | Age: 79
Discharge: HOME | End: 2024-08-09
Payer: MEDICARE

## 2024-08-09 DIAGNOSIS — R92.8 ABNORMAL MAMMOGRAM: ICD-10-CM

## 2024-08-09 PROCEDURE — 76642 ULTRASOUND BREAST LIMITED: CPT | Mod: RT

## 2024-08-09 PROCEDURE — 76982 USE 1ST TARGET LESION: CPT | Mod: RT

## 2024-09-25 ENCOUNTER — APPOINTMENT (OUTPATIENT)
Dept: PRIMARY CARE | Facility: CLINIC | Age: 79
End: 2024-09-25
Payer: MEDICARE

## 2024-09-25 VITALS
TEMPERATURE: 97.7 F | BODY MASS INDEX: 25.51 KG/M2 | WEIGHT: 148.6 LBS | OXYGEN SATURATION: 98 % | SYSTOLIC BLOOD PRESSURE: 124 MMHG | HEART RATE: 58 BPM | DIASTOLIC BLOOD PRESSURE: 60 MMHG

## 2024-09-25 DIAGNOSIS — E11.9 CONTROLLED TYPE 2 DIABETES MELLITUS WITHOUT COMPLICATION, WITHOUT LONG-TERM CURRENT USE OF INSULIN (MULTI): ICD-10-CM

## 2024-09-25 DIAGNOSIS — R53.83 OTHER FATIGUE: ICD-10-CM

## 2024-09-25 DIAGNOSIS — E03.8 HYPOTHYROIDISM DUE TO HASHIMOTO'S THYROIDITIS: ICD-10-CM

## 2024-09-25 DIAGNOSIS — E55.9 VITAMIN D DEFICIENCY: ICD-10-CM

## 2024-09-25 DIAGNOSIS — I10 BENIGN ESSENTIAL HYPERTENSION: ICD-10-CM

## 2024-09-25 DIAGNOSIS — E06.3 HYPOTHYROIDISM DUE TO HASHIMOTO'S THYROIDITIS: ICD-10-CM

## 2024-09-25 DIAGNOSIS — R92.8 ABNORMAL MAMMOGRAM: Primary | ICD-10-CM

## 2024-09-25 PROBLEM — E11.69 TYPE 2 DIABETES MELLITUS WITH OTHER SPECIFIED COMPLICATION, WITHOUT LONG-TERM CURRENT USE OF INSULIN: Status: RESOLVED | Noted: 2023-01-25 | Resolved: 2024-09-25

## 2024-09-25 PROCEDURE — 1159F MED LIST DOCD IN RCRD: CPT | Performed by: INTERNAL MEDICINE

## 2024-09-25 PROCEDURE — 3078F DIAST BP <80 MM HG: CPT | Performed by: INTERNAL MEDICINE

## 2024-09-25 PROCEDURE — 1036F TOBACCO NON-USER: CPT | Performed by: INTERNAL MEDICINE

## 2024-09-25 PROCEDURE — 1160F RVW MEDS BY RX/DR IN RCRD: CPT | Performed by: INTERNAL MEDICINE

## 2024-09-25 PROCEDURE — 99214 OFFICE O/P EST MOD 30 MIN: CPT | Performed by: INTERNAL MEDICINE

## 2024-09-25 PROCEDURE — 3074F SYST BP LT 130 MM HG: CPT | Performed by: INTERNAL MEDICINE

## 2024-09-25 RX ORDER — LOSARTAN POTASSIUM 50 MG/1
50 TABLET ORAL DAILY
Qty: 90 TABLET | Refills: 0 | Status: SHIPPED | OUTPATIENT
Start: 2024-09-25 | End: 2024-12-24

## 2024-09-25 RX ORDER — LEVOTHYROXINE SODIUM 50 UG/1
50 TABLET ORAL
Qty: 90 TABLET | Refills: 0 | Status: SHIPPED | OUTPATIENT
Start: 2024-09-25 | End: 2024-12-24

## 2024-09-25 RX ORDER — METFORMIN HYDROCHLORIDE 500 MG/1
500 TABLET ORAL
Qty: 90 TABLET | Refills: 0 | Status: SHIPPED | OUTPATIENT
Start: 2024-09-25 | End: 2024-12-24

## 2024-09-25 NOTE — PROGRESS NOTES
Subjective   Patient ID: Luz Elena Bloom is a 78 y.o. female who presents for Follow-up (3 month).  HPI    Routine follow up    No complaints    Mammogram rev'd  Recheck rt 2-25    Onchomycosis big toe rt  Penlac as directed  Left foot bunion  Podiatry following     Hearing loss  Cerumen right  ENT consult / not done yet     Allergies  Claritin daily    Hypertension stable on rx no side effects  EKG ok 12-23  cozaar 50 mg daily  CT cardiac score ordered / not done  Statin on follow up     Nicotine quit 12-23     DM 2  stable on rx no side effects  HBA1C 6.9  12-23     Hypothyroidism on rx no side effects     Diet / exercise rev'd     Review of Systems   All other systems reviewed and are negative.      Objective   /60   Pulse 58   Temp 36.5 °C (97.7 °F)   Wt 67.4 kg (148 lb 9.6 oz)   SpO2 98%   BMI 25.51 kg/m²   Lab Results   Component Value Date    WBC 6.2 12/04/2023    HGB 13.5 12/04/2023    HCT 40.8 12/04/2023     12/04/2023    CHOL 190 12/04/2023    TRIG 117 12/04/2023    HDL 49.6 12/04/2023    ALT 14 12/04/2023    AST 15 12/04/2023     12/04/2023    K 4.2 12/04/2023     12/04/2023    CREATININE 0.83 12/04/2023    BUN 18 12/04/2023    CO2 24 12/04/2023    TSH 3.67 12/04/2023    HGBA1C 6.9 (H) 12/04/2023           Physical Exam  Vitals reviewed.   Constitutional:       Appearance: Normal appearance. She is normal weight.   HENT:      Head: Normocephalic and atraumatic.      Mouth/Throat:      Pharynx: No posterior oropharyngeal erythema.   Eyes:      General: No scleral icterus.     Conjunctiva/sclera: Conjunctivae normal.      Pupils: Pupils are equal, round, and reactive to light.   Cardiovascular:      Rate and Rhythm: Normal rate and regular rhythm.      Heart sounds: Normal heart sounds.   Pulmonary:      Effort: No respiratory distress.      Breath sounds: No wheezing.   Abdominal:      General: Abdomen is flat. Bowel sounds are normal. There is no distension.      Palpations:  Abdomen is soft. There is no mass.      Tenderness: There is no abdominal tenderness. There is no rebound.   Musculoskeletal:         General: Normal range of motion.      Cervical back: Normal range of motion and neck supple.   Skin:     General: Skin is warm and dry.   Neurological:      General: No focal deficit present.      Mental Status: She is alert and oriented to person, place, and time. Mental status is at baseline.   Psychiatric:         Mood and Affect: Mood normal.         Behavior: Behavior normal.         Thought Content: Thought content normal.         Judgment: Judgment normal.         Problem List Items Addressed This Visit             ICD-10-CM    Abnormal mammogram - Primary R92.8    Relevant Orders    BI mammo right diagnostic    Benign essential hypertension I10    Relevant Medications    losartan (Cozaar) 50 mg tablet    Other Relevant Orders    Comprehensive Metabolic Panel    Lipid Panel    TSH with reflex to Free T4 if abnormal    Hypothyroidism E03.9    Relevant Medications    levothyroxine (Synthroid, Levoxyl) 50 mcg tablet    Vitamin D deficiency E55.9    Relevant Orders    Vitamin D 25-Hydroxy,Total (for eval of Vitamin D levels)     Other Visit Diagnoses         Codes    Controlled type 2 diabetes mellitus without complication, without long-term current use of insulin (Multi)     E11.9    Relevant Medications    metFORMIN (Glucophage) 500 mg tablet    Other Relevant Orders    Hemoglobin A1C    Albumin-Creatinine Ratio, Urine Random    Other fatigue     R53.83    Relevant Orders    CBC and Auto Differential    BMI 25.0-25.9,adult     Z68.25          Assessment/Plan     No complaints    Mammogram rev'd  Recheck rt 2-25    Onchomycosis big toe rt  Penlac as directed  Left foot bunion  Podiatry following     Hearing loss  Cerumen right  ENT consult / not done yet     Allergies  Claritin daily    Hypertension stable on rx no side effects  EKG ok 12-23  cozaar 50 mg daily  CT cardiac score  ordered / not done  Statin on follow up     Nicotine quit 12-23     DM 2  stable on rx no side effects  HBA1C 6.9  12-23     Hypothyroidism on rx no side effects     Diet / exercise rev'd    Mammogram 8-24  recheck 2-25  Dexa n/a  Colonoscopy n/a  CT chest lung cancer screening n/a  GYN n/a  immunizations rev'd pt declined  BMI 27.9    Check labs before appt    Follow up 3 months / medicare physical

## 2024-12-17 ENCOUNTER — LAB (OUTPATIENT)
Dept: LAB | Facility: LAB | Age: 79
End: 2024-12-17
Payer: MEDICARE

## 2024-12-17 DIAGNOSIS — E55.9 VITAMIN D DEFICIENCY: ICD-10-CM

## 2024-12-17 DIAGNOSIS — R53.83 OTHER FATIGUE: ICD-10-CM

## 2024-12-17 DIAGNOSIS — I10 BENIGN ESSENTIAL HYPERTENSION: ICD-10-CM

## 2024-12-17 DIAGNOSIS — E11.9 CONTROLLED TYPE 2 DIABETES MELLITUS WITHOUT COMPLICATION, WITHOUT LONG-TERM CURRENT USE OF INSULIN (MULTI): ICD-10-CM

## 2024-12-17 LAB
25(OH)D3 SERPL-MCNC: 41 NG/ML (ref 30–100)
ALBUMIN SERPL BCP-MCNC: 4.2 G/DL (ref 3.4–5)
ALP SERPL-CCNC: 60 U/L (ref 33–136)
ALT SERPL W P-5'-P-CCNC: 9 U/L (ref 7–45)
ANION GAP SERPL CALC-SCNC: 12 MMOL/L (ref 10–20)
AST SERPL W P-5'-P-CCNC: 13 U/L (ref 9–39)
BASOPHILS # BLD AUTO: 0.05 X10*3/UL (ref 0–0.1)
BASOPHILS NFR BLD AUTO: 0.7 %
BILIRUB SERPL-MCNC: 0.7 MG/DL (ref 0–1.2)
BUN SERPL-MCNC: 19 MG/DL (ref 6–23)
CALCIUM SERPL-MCNC: 9.6 MG/DL (ref 8.6–10.3)
CHLORIDE SERPL-SCNC: 106 MMOL/L (ref 98–107)
CHOLEST SERPL-MCNC: 202 MG/DL (ref 0–199)
CHOLESTEROL/HDL RATIO: 4.4
CO2 SERPL-SCNC: 28 MMOL/L (ref 21–32)
CREAT SERPL-MCNC: 0.74 MG/DL (ref 0.5–1.05)
CREAT UR-MCNC: 116.5 MG/DL (ref 20–320)
EGFRCR SERPLBLD CKD-EPI 2021: 83 ML/MIN/1.73M*2
EOSINOPHIL # BLD AUTO: 0.11 X10*3/UL (ref 0–0.4)
EOSINOPHIL NFR BLD AUTO: 1.6 %
ERYTHROCYTE [DISTWIDTH] IN BLOOD BY AUTOMATED COUNT: 13.2 % (ref 11.5–14.5)
EST. AVERAGE GLUCOSE BLD GHB EST-MCNC: 154 MG/DL
GLUCOSE SERPL-MCNC: 158 MG/DL (ref 74–99)
HBA1C MFR BLD: 7 %
HCT VFR BLD AUTO: 39.8 % (ref 36–46)
HDLC SERPL-MCNC: 46.4 MG/DL
HGB BLD-MCNC: 13.3 G/DL (ref 12–16)
IMM GRANULOCYTES # BLD AUTO: 0.01 X10*3/UL (ref 0–0.5)
IMM GRANULOCYTES NFR BLD AUTO: 0.1 % (ref 0–0.9)
LDLC SERPL CALC-MCNC: 129 MG/DL
LYMPHOCYTES # BLD AUTO: 2.17 X10*3/UL (ref 0.8–3)
LYMPHOCYTES NFR BLD AUTO: 31.6 %
MCH RBC QN AUTO: 32.8 PG (ref 26–34)
MCHC RBC AUTO-ENTMCNC: 33.4 G/DL (ref 32–36)
MCV RBC AUTO: 98 FL (ref 80–100)
MICROALBUMIN UR-MCNC: <7 MG/L
MICROALBUMIN/CREAT UR: NORMAL MG/G{CREAT}
MONOCYTES # BLD AUTO: 0.49 X10*3/UL (ref 0.05–0.8)
MONOCYTES NFR BLD AUTO: 7.1 %
NEUTROPHILS # BLD AUTO: 4.03 X10*3/UL (ref 1.6–5.5)
NEUTROPHILS NFR BLD AUTO: 58.9 %
NON HDL CHOLESTEROL: 156 MG/DL (ref 0–149)
NRBC BLD-RTO: 0 /100 WBCS (ref 0–0)
PLATELET # BLD AUTO: 247 X10*3/UL (ref 150–450)
POTASSIUM SERPL-SCNC: 4.3 MMOL/L (ref 3.5–5.3)
PROT SERPL-MCNC: 7.1 G/DL (ref 6.4–8.2)
RBC # BLD AUTO: 4.06 X10*6/UL (ref 4–5.2)
SODIUM SERPL-SCNC: 142 MMOL/L (ref 136–145)
TRIGL SERPL-MCNC: 133 MG/DL (ref 0–149)
TSH SERPL-ACNC: 2.9 MIU/L (ref 0.44–3.98)
VLDL: 27 MG/DL (ref 0–40)
WBC # BLD AUTO: 6.9 X10*3/UL (ref 4.4–11.3)

## 2024-12-17 PROCEDURE — 82306 VITAMIN D 25 HYDROXY: CPT

## 2024-12-17 PROCEDURE — 82043 UR ALBUMIN QUANTITATIVE: CPT

## 2024-12-17 PROCEDURE — 36415 COLL VENOUS BLD VENIPUNCTURE: CPT

## 2024-12-17 PROCEDURE — 82570 ASSAY OF URINE CREATININE: CPT

## 2024-12-17 PROCEDURE — 83036 HEMOGLOBIN GLYCOSYLATED A1C: CPT

## 2024-12-18 ENCOUNTER — APPOINTMENT (OUTPATIENT)
Dept: PRIMARY CARE | Facility: CLINIC | Age: 79
End: 2024-12-18
Payer: MEDICARE

## 2024-12-18 VITALS
BODY MASS INDEX: 24.79 KG/M2 | SYSTOLIC BLOOD PRESSURE: 142 MMHG | HEART RATE: 58 BPM | TEMPERATURE: 97.6 F | DIASTOLIC BLOOD PRESSURE: 60 MMHG | WEIGHT: 145.2 LBS | OXYGEN SATURATION: 100 % | HEIGHT: 64 IN

## 2024-12-18 DIAGNOSIS — E11.9 CONTROLLED TYPE 2 DIABETES MELLITUS WITHOUT COMPLICATION, WITHOUT LONG-TERM CURRENT USE OF INSULIN (MULTI): ICD-10-CM

## 2024-12-18 DIAGNOSIS — F17.200 NICOTINE DEPENDENCE, UNCOMPLICATED, UNSPECIFIED NICOTINE PRODUCT TYPE: ICD-10-CM

## 2024-12-18 DIAGNOSIS — E06.3 HYPOTHYROIDISM DUE TO HASHIMOTO'S THYROIDITIS: ICD-10-CM

## 2024-12-18 DIAGNOSIS — Z71.2 ENCOUNTER TO DISCUSS TEST RESULTS: ICD-10-CM

## 2024-12-18 DIAGNOSIS — Z00.00 ENCOUNTER FOR SUBSEQUENT ANNUAL WELLNESS VISIT (AWV) IN MEDICARE PATIENT: Primary | ICD-10-CM

## 2024-12-18 DIAGNOSIS — E78.00 HYPERCHOLESTEREMIA: ICD-10-CM

## 2024-12-18 DIAGNOSIS — I10 BENIGN ESSENTIAL HYPERTENSION: ICD-10-CM

## 2024-12-18 PROCEDURE — 1170F FXNL STATUS ASSESSED: CPT | Performed by: INTERNAL MEDICINE

## 2024-12-18 PROCEDURE — 3077F SYST BP >= 140 MM HG: CPT | Performed by: INTERNAL MEDICINE

## 2024-12-18 PROCEDURE — 99406 BEHAV CHNG SMOKING 3-10 MIN: CPT | Performed by: INTERNAL MEDICINE

## 2024-12-18 PROCEDURE — G0446 INTENS BEHAVE THER CARDIO DX: HCPCS | Performed by: INTERNAL MEDICINE

## 2024-12-18 PROCEDURE — G0439 PPPS, SUBSEQ VISIT: HCPCS | Performed by: INTERNAL MEDICINE

## 2024-12-18 PROCEDURE — 99214 OFFICE O/P EST MOD 30 MIN: CPT | Performed by: INTERNAL MEDICINE

## 2024-12-18 PROCEDURE — 1159F MED LIST DOCD IN RCRD: CPT | Performed by: INTERNAL MEDICINE

## 2024-12-18 PROCEDURE — 1124F ACP DISCUSS-NO DSCNMKR DOCD: CPT | Performed by: INTERNAL MEDICINE

## 2024-12-18 PROCEDURE — 1160F RVW MEDS BY RX/DR IN RCRD: CPT | Performed by: INTERNAL MEDICINE

## 2024-12-18 PROCEDURE — 1036F TOBACCO NON-USER: CPT | Performed by: INTERNAL MEDICINE

## 2024-12-18 PROCEDURE — 3078F DIAST BP <80 MM HG: CPT | Performed by: INTERNAL MEDICINE

## 2024-12-18 RX ORDER — LANCETS 28 GAUGE
EACH MISCELLANEOUS
Qty: 100 EACH | Refills: 3 | Status: SHIPPED | OUTPATIENT
Start: 2024-12-18 | End: 2024-12-19

## 2024-12-18 RX ORDER — ROSUVASTATIN CALCIUM 5 MG/1
5 TABLET, COATED ORAL DAILY
Qty: 90 TABLET | Refills: 0 | Status: SHIPPED | OUTPATIENT
Start: 2024-12-18 | End: 2025-03-18

## 2024-12-18 RX ORDER — LOSARTAN POTASSIUM 50 MG/1
50 TABLET ORAL DAILY
Qty: 90 TABLET | Refills: 0 | Status: SHIPPED | OUTPATIENT
Start: 2024-12-18 | End: 2025-03-18

## 2024-12-18 RX ORDER — METFORMIN HYDROCHLORIDE 1000 MG/1
1000 TABLET ORAL
Qty: 90 TABLET | Refills: 0 | Status: SHIPPED | OUTPATIENT
Start: 2024-12-18 | End: 2025-03-18

## 2024-12-18 RX ORDER — BLOOD-GLUCOSE METER
100 KIT MISCELLANEOUS DAILY
Qty: 200 STRIP | Refills: 3 | Status: SHIPPED | OUTPATIENT
Start: 2024-12-18

## 2024-12-18 RX ORDER — LEVOTHYROXINE SODIUM 50 UG/1
50 TABLET ORAL
Qty: 90 TABLET | Refills: 0 | Status: SHIPPED | OUTPATIENT
Start: 2024-12-18 | End: 2025-03-18

## 2024-12-18 ASSESSMENT — ACTIVITIES OF DAILY LIVING (ADL)
DRESSING: INDEPENDENT
DOING_HOUSEWORK: INDEPENDENT
TAKING_MEDICATION: INDEPENDENT
BATHING: INDEPENDENT
MANAGING_FINANCES: INDEPENDENT
GROCERY_SHOPPING: INDEPENDENT

## 2024-12-18 ASSESSMENT — ENCOUNTER SYMPTOMS
LOSS OF SENSATION IN FEET: 0
OCCASIONAL FEELINGS OF UNSTEADINESS: 0
DEPRESSION: 0

## 2024-12-18 ASSESSMENT — PATIENT HEALTH QUESTIONNAIRE - PHQ9
1. LITTLE INTEREST OR PLEASURE IN DOING THINGS: NOT AT ALL
2. FEELING DOWN, DEPRESSED OR HOPELESS: NOT AT ALL
SUM OF ALL RESPONSES TO PHQ9 QUESTIONS 1 AND 2: 0

## 2024-12-18 NOTE — PROGRESS NOTES
"Subjective   Reason for Visit: Luz Elena Bloom is an 79 y.o. female here for a Medicare Wellness visit and follow up    Past Medical, Surgical, and Family History reviewed and updated in chart.    Reviewed all medications by prescribing practitioner or clinical pharmacist (such as prescriptions, OTCs, herbal therapies and supplements) and documented in the medical record.    HPI    Medicare wellness    Routine follow up    Labs rev'd    Feels well     Mammogram rev'd  Recheck rt 2-25     Onchomycosis big toe rt  Penlac as directed  Left foot bunion  Podiatry following     Hearing loss  Cerumen right  ENT consult / not done yet     Allergies  Claritin daily    Hypertension stable on rx no side effects  EKG ok 12-23  cozaar 50 mg daily  CT cardiac score ordered / not done    Hypercholesterolemia  Start crestor 5 mg daily  Risk / benefits rev'd     Nicotine quit 12-23  Now off and on     DM 2  on rx no side effects  HBA1C 7  12-24  Increase 1000 mg daily     Hypothyroidism stable on rx no side effects     Diet / exercise rev'd    Patient Care Team:  Mary Jo Brooks MD as PCP - General (Internal Medicine)  Mary Jo Brooks MD as PCP - [facility] Attributed Provider     Review of Systems   All other systems reviewed and are negative.      Objective   Vitals:  /60   Pulse 58   Temp 36.4 °C (97.6 °F)   Ht 1.626 m (5' 4\")   Wt 65.9 kg (145 lb 3.2 oz)   SpO2 100%   BMI 24.92 kg/m²       Physical Exam  Vitals reviewed.   Constitutional:       Appearance: Normal appearance. She is normal weight.   HENT:      Head: Normocephalic and atraumatic.      Mouth/Throat:      Pharynx: No posterior oropharyngeal erythema.   Eyes:      General: No scleral icterus.     Conjunctiva/sclera: Conjunctivae normal.      Pupils: Pupils are equal, round, and reactive to light.   Cardiovascular:      Rate and Rhythm: Normal rate and regular rhythm.      Heart sounds: Normal heart sounds.   Pulmonary:      Effort: No respiratory " distress.      Breath sounds: No wheezing.   Abdominal:      General: Abdomen is flat. Bowel sounds are normal. There is no distension.      Palpations: Abdomen is soft. There is no mass.      Tenderness: There is no abdominal tenderness. There is no rebound.   Musculoskeletal:         General: Normal range of motion.      Cervical back: Normal range of motion and neck supple.   Skin:     General: Skin is warm and dry.   Neurological:      General: No focal deficit present.      Mental Status: She is alert and oriented to person, place, and time. Mental status is at baseline.   Psychiatric:         Mood and Affect: Mood normal.         Behavior: Behavior normal.         Thought Content: Thought content normal.         Judgment: Judgment normal.         Assessment & Plan  Encounter for subsequent annual wellness visit (AWV) in Medicare patient         Encounter to discuss test results         Controlled type 2 diabetes mellitus without complication, without long-term current use of insulin (Multi)    Orders:    FreeStyle Lancets 28 gauge; Use as instructed    blood sugar diagnostic (FreeStyle Lite Strips) strip; 100 strips once daily.    metFORMIN (Glucophage) 1,000 mg tablet; Take 1 tablet (1,000 mg) by mouth once daily with breakfast.    Hypercholesteremia    Orders:    rosuvastatin (Crestor) 5 mg tablet; Take 1 tablet (5 mg) by mouth once daily.    Benign essential hypertension    Orders:    losartan (Cozaar) 50 mg tablet; Take 1 tablet (50 mg) by mouth once daily.    Hypothyroidism due to Hashimoto's thyroiditis    Orders:    levothyroxine (Synthroid, Levoxyl) 50 mcg tablet; Take 1 tablet (50 mcg) by mouth once daily in the morning. Take before meals.    Nicotine dependence, uncomplicated, unspecified nicotine product type         BMI 24.0-24.9, adult                   Medicare wellness    Routine follow up    Labs rev'd    Feels well     Mammogram rev'd  Recheck rt 2-25     Onchomycosis big toe rt  Penlac as  directed  Left foot bunion  Podiatry following     Hearing loss  Cerumen right  ENT consult / not done yet     Allergies  Claritin daily    Hypertension stable on rx no side effects  EKG ok 12-23  cozaar 50 mg daily  CT cardiac score ordered / not done    Hypercholesterolemia  Start crestor 5 mg daily  Risk / benefits rev'd  I spent 15 minutes face-to-face with this individual discussing their cardiovascular risk and behavioral therapies of nutritional choices, exercise, and elimination of habits contributing to risk. We agreed on plan how they may be able to reduce their current cardiovascular risk.  Patient 10 year cardiac risk estimate calculates :   53.9    %      Nicotine quit 12-23  Now off and on  smoking and alcohol  cessation discussed   I spent more  than 3 min but less than 10 min counselling pt about need for smoking / tobacco cessation  and how I can support efforts when pt is ready to quit      DM 2  on rx no side effects  HBA1C 7  12-24  Increase 1000 mg daily  follow     Hypothyroidism stable on rx no side effects     Diet / exercise rev'd    Mammogram 8-24  recheck 2-25  Dexa n/a  Colonoscopy n/a  CT chest lung cancer screening n/a  GYN n/a  immunizations rev'd pt declined  BMI 24.9    Follow up 3 months

## 2024-12-19 RX ORDER — LANCETS 28 GAUGE
EACH MISCELLANEOUS
Qty: 100 EACH | Refills: 3 | Status: SHIPPED | OUTPATIENT
Start: 2024-12-19

## 2025-01-28 DIAGNOSIS — R92.8 ABNORMAL MAMMOGRAM: ICD-10-CM

## 2025-02-03 ENCOUNTER — APPOINTMENT (OUTPATIENT)
Dept: RADIOLOGY | Facility: HOSPITAL | Age: 80
End: 2025-02-03
Payer: MEDICARE

## 2025-02-05 ENCOUNTER — HOSPITAL ENCOUNTER (OUTPATIENT)
Dept: RADIOLOGY | Facility: HOSPITAL | Age: 80
Discharge: HOME | End: 2025-02-05
Payer: MEDICARE

## 2025-02-05 DIAGNOSIS — R92.8 ABNORMAL MAMMOGRAM: ICD-10-CM

## 2025-02-05 PROCEDURE — 76642 ULTRASOUND BREAST LIMITED: CPT | Mod: RT

## 2025-02-05 PROCEDURE — 77065 DX MAMMO INCL CAD UNI: CPT | Mod: RIGHT SIDE | Performed by: RADIOLOGY

## 2025-02-05 PROCEDURE — 77061 BREAST TOMOSYNTHESIS UNI: CPT | Mod: RT

## 2025-02-05 PROCEDURE — 76983 USE EA ADDL TARGET LESION: CPT | Mod: RT

## 2025-02-05 PROCEDURE — 76642 ULTRASOUND BREAST LIMITED: CPT | Mod: RIGHT SIDE | Performed by: RADIOLOGY

## 2025-02-05 PROCEDURE — 77061 BREAST TOMOSYNTHESIS UNI: CPT | Mod: RIGHT SIDE | Performed by: RADIOLOGY

## 2025-03-18 ENCOUNTER — APPOINTMENT (OUTPATIENT)
Dept: PRIMARY CARE | Facility: CLINIC | Age: 80
End: 2025-03-18
Payer: MEDICARE

## 2025-03-18 VITALS
TEMPERATURE: 97.9 F | DIASTOLIC BLOOD PRESSURE: 50 MMHG | WEIGHT: 145.6 LBS | OXYGEN SATURATION: 98 % | HEART RATE: 57 BPM | BODY MASS INDEX: 24.99 KG/M2 | SYSTOLIC BLOOD PRESSURE: 140 MMHG

## 2025-03-18 DIAGNOSIS — E11.9 CONTROLLED TYPE 2 DIABETES MELLITUS WITHOUT COMPLICATION, WITHOUT LONG-TERM CURRENT USE OF INSULIN (MULTI): ICD-10-CM

## 2025-03-18 DIAGNOSIS — R92.8 ABNORMAL MAMMOGRAM: Primary | ICD-10-CM

## 2025-03-18 DIAGNOSIS — E78.00 HYPERCHOLESTEREMIA: ICD-10-CM

## 2025-03-18 DIAGNOSIS — I10 BENIGN ESSENTIAL HYPERTENSION: ICD-10-CM

## 2025-03-18 DIAGNOSIS — E06.3 HYPOTHYROIDISM DUE TO HASHIMOTO'S THYROIDITIS: ICD-10-CM

## 2025-03-18 PROCEDURE — G2211 COMPLEX E/M VISIT ADD ON: HCPCS | Performed by: INTERNAL MEDICINE

## 2025-03-18 PROCEDURE — 3077F SYST BP >= 140 MM HG: CPT | Performed by: INTERNAL MEDICINE

## 2025-03-18 PROCEDURE — 1160F RVW MEDS BY RX/DR IN RCRD: CPT | Performed by: INTERNAL MEDICINE

## 2025-03-18 PROCEDURE — 1036F TOBACCO NON-USER: CPT | Performed by: INTERNAL MEDICINE

## 2025-03-18 PROCEDURE — 1124F ACP DISCUSS-NO DSCNMKR DOCD: CPT | Performed by: INTERNAL MEDICINE

## 2025-03-18 PROCEDURE — 1159F MED LIST DOCD IN RCRD: CPT | Performed by: INTERNAL MEDICINE

## 2025-03-18 PROCEDURE — 99214 OFFICE O/P EST MOD 30 MIN: CPT | Performed by: INTERNAL MEDICINE

## 2025-03-18 PROCEDURE — 3078F DIAST BP <80 MM HG: CPT | Performed by: INTERNAL MEDICINE

## 2025-03-18 RX ORDER — LOSARTAN POTASSIUM 50 MG/1
50 TABLET ORAL DAILY
Qty: 90 TABLET | Refills: 0 | Status: SHIPPED | OUTPATIENT
Start: 2025-03-18 | End: 2025-06-16

## 2025-03-18 RX ORDER — METFORMIN HYDROCHLORIDE 1000 MG/1
1000 TABLET ORAL
Qty: 90 TABLET | Refills: 0 | Status: SHIPPED | OUTPATIENT
Start: 2025-03-18 | End: 2025-06-16

## 2025-03-18 RX ORDER — ROSUVASTATIN CALCIUM 5 MG/1
5 TABLET, COATED ORAL DAILY
Qty: 90 TABLET | Refills: 0 | Status: CANCELLED | OUTPATIENT
Start: 2025-03-18 | End: 2025-06-16

## 2025-03-18 RX ORDER — LEVOTHYROXINE SODIUM 50 UG/1
50 TABLET ORAL
Qty: 90 TABLET | Refills: 0 | Status: SHIPPED | OUTPATIENT
Start: 2025-03-18 | End: 2025-06-16

## 2025-03-18 NOTE — PROGRESS NOTES
Subjective   Patient ID: Luz Elena Bloom is a 79 y.o. female who presents for Follow-up (3 month ).  HPI    Routine follow up     No complaints     Mammogram rev'd  Recheck 8-25     Onchomycosis big toe rt  Penlac as directed  Left foot bunion  Podiatry following     Hearing loss  Cerumen right  ENT consult / not done yet     Allergies  Claritin daily    Hypertension stable on rx no side effects  EKG ok 12-23  cozaar 50 mg daily  CT cardiac score ordered / not done     Hypercholesterolemia  crestor 5 mg one / week since daily caused insomnia     Nicotine quit 12-23  Now off      DM 2  on rx no side effects  HBA1C 7  12-24  Increase 1000 mg daily  follow     Hypothyroidism stable on rx no side effects     Diet / exercise rev'd    Review of Systems   All other systems reviewed and are negative.      Objective   /50   Pulse 57   Temp 36.6 °C (97.9 °F)   Wt 66 kg (145 lb 9.6 oz)   SpO2 98%   BMI 24.99 kg/m²   Lab Results   Component Value Date    WBC 6.9 12/17/2024    HGB 13.3 12/17/2024    HCT 39.8 12/17/2024     12/17/2024    CHOL 202 (H) 12/17/2024    TRIG 133 12/17/2024    HDL 46.4 12/17/2024    ALT 9 12/17/2024    AST 13 12/17/2024     12/17/2024    K 4.3 12/17/2024     12/17/2024    CREATININE 0.74 12/17/2024    BUN 19 12/17/2024    CO2 28 12/17/2024    TSH 2.90 12/17/2024    HGBA1C 7.0 (H) 12/17/2024           Physical Exam  Vitals reviewed.   Constitutional:       Appearance: Normal appearance. She is normal weight.   HENT:      Head: Normocephalic and atraumatic.      Mouth/Throat:      Pharynx: No posterior oropharyngeal erythema.   Eyes:      General: No scleral icterus.     Conjunctiva/sclera: Conjunctivae normal.      Pupils: Pupils are equal, round, and reactive to light.   Cardiovascular:      Rate and Rhythm: Normal rate and regular rhythm.      Heart sounds: Normal heart sounds.   Pulmonary:      Effort: No respiratory distress.      Breath sounds: No wheezing.   Abdominal:       General: Abdomen is flat. Bowel sounds are normal. There is no distension.      Palpations: Abdomen is soft. There is no mass.      Tenderness: There is no abdominal tenderness. There is no rebound.   Musculoskeletal:         General: Normal range of motion.      Cervical back: Normal range of motion and neck supple.   Skin:     General: Skin is warm and dry.   Neurological:      General: No focal deficit present.      Mental Status: She is alert and oriented to person, place, and time. Mental status is at baseline.   Psychiatric:         Mood and Affect: Mood normal.         Behavior: Behavior normal.         Thought Content: Thought content normal.         Judgment: Judgment normal.         Problem List Items Addressed This Visit             ICD-10-CM    Abnormal mammogram - Primary R92.8    Benign essential hypertension I10    Relevant Medications    losartan (Cozaar) 50 mg tablet     Other Visit Diagnoses         Codes    Hypercholesteremia     E78.00    Controlled type 2 diabetes mellitus without complication, without long-term current use of insulin (Multi)     E11.9    Relevant Medications    metFORMIN (Glucophage) 1,000 mg tablet    Hypothyroidism due to Hashimoto's thyroiditis     E06.3    Relevant Medications    levothyroxine (Synthroid, Levoxyl) 50 mcg tablet    BMI 24.0-24.9, adult     Z68.24          Assessment/Plan        No complaints     Mammogram rev'd  Recheck 8-25     Onchomycosis big toe rt  Penlac as directed  Left foot bunion  Podiatry following     Hearing loss  Cerumen right  ENT consult / not done yet     Allergies  Claritin daily    Hypertension stable on rx no side effects  EKG ok 12-23  cozaar 50 mg daily  CT cardiac score ordered / not done     Hypercholesterolemia  crestor 5 mg one / week since daily caused insomnia     Nicotine quit 12-23  Now off      DM 2  on rx no side effects  HBA1C 7  12-24  On 1000 mg daily  follow     Hypothyroidism stable on rx no side effects     Diet /  exercise rev'd    Mammogram 8-24  recheck 8-25  Dexa n/a  Colonoscopy n/a  CT chest lung cancer screening n/a  GYN n/a  immunizations rev'd pt declined  BMI 24.9    Follow up 3 months

## 2025-03-20 DIAGNOSIS — E78.00 HYPERCHOLESTEREMIA: ICD-10-CM

## 2025-03-20 RX ORDER — ROSUVASTATIN CALCIUM 5 MG/1
5 TABLET, COATED ORAL DAILY
Qty: 90 TABLET | Refills: 0 | Status: SHIPPED | OUTPATIENT
Start: 2025-03-20 | End: 2025-06-18

## 2025-06-19 ENCOUNTER — APPOINTMENT (OUTPATIENT)
Dept: PRIMARY CARE | Facility: CLINIC | Age: 80
End: 2025-06-19
Payer: MEDICARE

## 2025-06-19 VITALS
HEART RATE: 53 BPM | TEMPERATURE: 97.5 F | DIASTOLIC BLOOD PRESSURE: 60 MMHG | SYSTOLIC BLOOD PRESSURE: 142 MMHG | OXYGEN SATURATION: 98 % | BODY MASS INDEX: 24.24 KG/M2 | WEIGHT: 141.2 LBS

## 2025-06-19 DIAGNOSIS — I10 BENIGN ESSENTIAL HYPERTENSION: ICD-10-CM

## 2025-06-19 DIAGNOSIS — G89.29 CHRONIC RIGHT-SIDED LOW BACK PAIN WITH RIGHT-SIDED SCIATICA: Primary | ICD-10-CM

## 2025-06-19 DIAGNOSIS — E78.00 HYPERCHOLESTEREMIA: ICD-10-CM

## 2025-06-19 DIAGNOSIS — M54.41 CHRONIC RIGHT-SIDED LOW BACK PAIN WITH RIGHT-SIDED SCIATICA: Primary | ICD-10-CM

## 2025-06-19 DIAGNOSIS — E06.3 HYPOTHYROIDISM DUE TO HASHIMOTO'S THYROIDITIS: ICD-10-CM

## 2025-06-19 DIAGNOSIS — R92.8 ABNORMAL MAMMOGRAM: ICD-10-CM

## 2025-06-19 DIAGNOSIS — E11.9 CONTROLLED TYPE 2 DIABETES MELLITUS WITHOUT COMPLICATION, WITHOUT LONG-TERM CURRENT USE OF INSULIN: ICD-10-CM

## 2025-06-19 PROCEDURE — 1159F MED LIST DOCD IN RCRD: CPT | Performed by: INTERNAL MEDICINE

## 2025-06-19 PROCEDURE — 3078F DIAST BP <80 MM HG: CPT | Performed by: INTERNAL MEDICINE

## 2025-06-19 PROCEDURE — 99214 OFFICE O/P EST MOD 30 MIN: CPT | Performed by: INTERNAL MEDICINE

## 2025-06-19 PROCEDURE — 1160F RVW MEDS BY RX/DR IN RCRD: CPT | Performed by: INTERNAL MEDICINE

## 2025-06-19 PROCEDURE — 1036F TOBACCO NON-USER: CPT | Performed by: INTERNAL MEDICINE

## 2025-06-19 PROCEDURE — G2211 COMPLEX E/M VISIT ADD ON: HCPCS | Performed by: INTERNAL MEDICINE

## 2025-06-19 PROCEDURE — 3077F SYST BP >= 140 MM HG: CPT | Performed by: INTERNAL MEDICINE

## 2025-06-19 RX ORDER — METHYLPREDNISOLONE 4 MG/1
TABLET ORAL
Qty: 21 TABLET | Refills: 0 | Status: SHIPPED | OUTPATIENT
Start: 2025-06-19 | End: 2025-06-25

## 2025-06-19 RX ORDER — LEVOTHYROXINE SODIUM 50 UG/1
50 TABLET ORAL
Qty: 90 TABLET | Refills: 0 | Status: SHIPPED | OUTPATIENT
Start: 2025-06-19 | End: 2025-09-17

## 2025-06-19 RX ORDER — METHYLPREDNISOLONE 4 MG/1
TABLET ORAL
Qty: 21 TABLET | Refills: 0 | Status: SHIPPED | OUTPATIENT
Start: 2025-06-19 | End: 2025-06-19 | Stop reason: SDUPTHER

## 2025-06-19 RX ORDER — ROSUVASTATIN CALCIUM 5 MG/1
5 TABLET, COATED ORAL DAILY
Qty: 90 TABLET | Refills: 0 | Status: SHIPPED | OUTPATIENT
Start: 2025-06-19 | End: 2025-09-17

## 2025-06-19 RX ORDER — LOSARTAN POTASSIUM 50 MG/1
50 TABLET ORAL DAILY
Qty: 90 TABLET | Refills: 0 | Status: SHIPPED | OUTPATIENT
Start: 2025-06-19 | End: 2025-09-17

## 2025-06-19 RX ORDER — METFORMIN HYDROCHLORIDE 1000 MG/1
1000 TABLET ORAL
Qty: 90 TABLET | Refills: 0 | Status: SHIPPED | OUTPATIENT
Start: 2025-06-19 | End: 2025-09-17

## 2025-06-19 ASSESSMENT — ENCOUNTER SYMPTOMS
LEG PAIN: 1
BACK PAIN: 1

## 2025-06-19 NOTE — PROGRESS NOTES
Subjective   Patient ID: Luz Elena Bloom is a 79 y.o. female who presents for Follow-up (3 MONTH ), Leg Pain (Mostly right leg x 1 month ), and Back Pain (Lower back x 1 month ).  Leg Pain     Back Pain  Associated symptoms include leg pain.     Routine follow up    Back pain and rt leg pain x 1 month  No known trauma  Stopped crestor no change  Told to resume  Chronic back pain with rt sciatica > left  Check x rays  Medrol dose toby as directed  Risk / benefits rev'd  Sees chiropractor    Abnormal mammogram  Recheck 8-25     Onchomycosis big toe rt  Penlac as directed  Left foot bunion  Podiatry following     Hearing loss  Cerumen right  ENT consult / not done yet     Allergies  Claritin daily    Hypertension stable on rx no side effects  EKG ok 12-23  cozaar 50 mg daily  CT cardiac score ordered / not done     Hypercholesterolemia  crestor 5 mg one / week since daily caused insomnia     Nicotine quit 12-23  Now off      DM 2  on rx no side effects  HBA1C 7  12-24  On 1000 mg daily  follow     Hypothyroidism stable on rx no side effects     Diet / exercise rev'd    Review of Systems   Musculoskeletal:  Positive for back pain.   All other systems reviewed and are negative.      Objective   /60   Pulse 53   Temp 36.4 °C (97.5 °F)   Wt 64 kg (141 lb 3.2 oz)   SpO2 98%   BMI 24.24 kg/m²   Lab Results   Component Value Date    WBC 6.9 12/17/2024    HGB 13.3 12/17/2024    HCT 39.8 12/17/2024     12/17/2024    CHOL 202 (H) 12/17/2024    TRIG 133 12/17/2024    HDL 46.4 12/17/2024    ALT 9 12/17/2024    AST 13 12/17/2024     12/17/2024    K 4.3 12/17/2024     12/17/2024    CREATININE 0.74 12/17/2024    BUN 19 12/17/2024    CO2 28 12/17/2024    TSH 2.90 12/17/2024    HGBA1C 7.0 (H) 12/17/2024           Physical Exam  Vitals reviewed.   Constitutional:       Appearance: Normal appearance. She is normal weight.   HENT:      Head: Normocephalic and atraumatic.      Mouth/Throat:      Pharynx: No  posterior oropharyngeal erythema.   Eyes:      General: No scleral icterus.     Conjunctiva/sclera: Conjunctivae normal.      Pupils: Pupils are equal, round, and reactive to light.   Cardiovascular:      Rate and Rhythm: Normal rate and regular rhythm.      Heart sounds: Normal heart sounds.   Pulmonary:      Effort: No respiratory distress.      Breath sounds: No wheezing.   Abdominal:      General: Abdomen is flat. Bowel sounds are normal. There is no distension.      Palpations: Abdomen is soft. There is no mass.      Tenderness: There is no abdominal tenderness. There is no rebound.   Musculoskeletal:         General: Normal range of motion.      Cervical back: Normal range of motion and neck supple.   Skin:     General: Skin is warm and dry.   Neurological:      General: No focal deficit present.      Mental Status: She is alert and oriented to person, place, and time. Mental status is at baseline.   Psychiatric:         Mood and Affect: Mood normal.         Behavior: Behavior normal.         Thought Content: Thought content normal.         Judgment: Judgment normal.         Problem List Items Addressed This Visit           ICD-10-CM    Abnormal mammogram R92.8    Relevant Orders    BI mammo bilateral diagnostic    Benign essential hypertension I10    Relevant Medications    losartan (Cozaar) 50 mg tablet     Other Visit Diagnoses         Codes      Chronic right-sided low back pain with right-sided sciatica    -  Primary M54.41, G89.29    Relevant Medications    methylPREDNISolone (Medrol Dospak) 4 mg tablets    Other Relevant Orders    XR lumbar spine complete 4+ views      Controlled type 2 diabetes mellitus without complication, without long-term current use of insulin     E11.9    Relevant Medications    metFORMIN (Glucophage) 1,000 mg tablet      Hypercholesteremia     E78.00    Relevant Medications    rosuvastatin (Crestor) 5 mg tablet      Hypothyroidism due to Hashimoto's thyroiditis     E06.3     Relevant Medications    levothyroxine (Synthroid, Levoxyl) 50 mcg tablet      BMI 24.0-24.9, adult     Z68.24          Assessment/Plan     Back pain and rt leg pain x 1 month  No known trauma  Stopped crestor no change  Told to resume  Chronic back pain with rt sciatica > left  Check x rays  Medrol dose toby as directed  Risk / benefits rev'd  Sees chiropractor    Abnormal mammogram  Recheck 8-25     Onchomycosis big toe rt  Penlac as directed  Left foot bunion  Podiatry following     Hearing loss  Cerumen right  ENT consult / not done yet     Allergies  Claritin daily    Hypertension stable on rx no side effects  EKG ok 12-23  cozaar 50 mg daily  CT cardiac score ordered / not done     Hypercholesterolemia  crestor 5 mg one / week since daily caused insomnia     Nicotine quit 12-23  Now off      DM 2  on rx no side effects  HBA1C 7  12-24  On 1000 mg daily  follow     Hypothyroidism stable on rx no side effects     Diet / exercise rev'd    Mammogram 8-24  recheck 8-25  Dexa n/a  Colonoscopy n/a  CT chest lung cancer screening n/a  GYN n/a  immunizations rev'd pt declined  BMI 24.2    Check mammogram, back x rays     Follow up 3 months

## 2025-06-20 ENCOUNTER — HOSPITAL ENCOUNTER (OUTPATIENT)
Dept: RADIOLOGY | Facility: HOSPITAL | Age: 80
Discharge: HOME | End: 2025-06-20
Payer: MEDICARE

## 2025-06-20 DIAGNOSIS — G89.29 CHRONIC RIGHT-SIDED LOW BACK PAIN WITH RIGHT-SIDED SCIATICA: ICD-10-CM

## 2025-06-20 DIAGNOSIS — M54.41 CHRONIC RIGHT-SIDED LOW BACK PAIN WITH RIGHT-SIDED SCIATICA: ICD-10-CM

## 2025-06-20 PROCEDURE — 72110 X-RAY EXAM L-2 SPINE 4/>VWS: CPT | Performed by: RADIOLOGY

## 2025-06-20 PROCEDURE — 72110 X-RAY EXAM L-2 SPINE 4/>VWS: CPT

## 2025-06-23 DIAGNOSIS — G89.29 CHRONIC RIGHT-SIDED LOW BACK PAIN WITH RIGHT-SIDED SCIATICA: ICD-10-CM

## 2025-06-23 DIAGNOSIS — M54.41 CHRONIC RIGHT-SIDED LOW BACK PAIN WITH RIGHT-SIDED SCIATICA: ICD-10-CM

## 2025-06-23 DIAGNOSIS — M79.605 BILATERAL LEG AND FOOT PAIN: ICD-10-CM

## 2025-06-23 DIAGNOSIS — M79.672 BILATERAL LEG AND FOOT PAIN: ICD-10-CM

## 2025-06-23 DIAGNOSIS — M79.604 BILATERAL LEG AND FOOT PAIN: ICD-10-CM

## 2025-06-23 DIAGNOSIS — M79.671 BILATERAL LEG AND FOOT PAIN: ICD-10-CM

## 2025-07-14 ENCOUNTER — HOSPITAL ENCOUNTER (OUTPATIENT)
Dept: RADIOLOGY | Facility: HOSPITAL | Age: 80
Discharge: HOME | End: 2025-07-14
Payer: MEDICARE

## 2025-07-14 VITALS — BODY MASS INDEX: 24.07 KG/M2 | HEIGHT: 64 IN | WEIGHT: 141 LBS

## 2025-07-14 DIAGNOSIS — R92.8 ABNORMAL MAMMOGRAM: ICD-10-CM

## 2025-07-14 PROCEDURE — 76642 ULTRASOUND BREAST LIMITED: CPT | Mod: RT

## 2025-07-14 PROCEDURE — 76642 ULTRASOUND BREAST LIMITED: CPT | Performed by: RADIOLOGY

## 2025-07-14 PROCEDURE — G0279 TOMOSYNTHESIS, MAMMO: HCPCS | Performed by: RADIOLOGY

## 2025-07-14 PROCEDURE — 77062 BREAST TOMOSYNTHESIS BI: CPT

## 2025-07-14 PROCEDURE — 77066 DX MAMMO INCL CAD BI: CPT | Performed by: RADIOLOGY

## 2025-07-17 ENCOUNTER — APPOINTMENT (OUTPATIENT)
Facility: CLINIC | Age: 80
End: 2025-07-17
Payer: MEDICARE

## 2025-07-17 VITALS
BODY MASS INDEX: 26.09 KG/M2 | HEART RATE: 60 BPM | DIASTOLIC BLOOD PRESSURE: 69 MMHG | HEIGHT: 62 IN | WEIGHT: 141.8 LBS | RESPIRATION RATE: 15 BRPM | OXYGEN SATURATION: 97 % | SYSTOLIC BLOOD PRESSURE: 137 MMHG

## 2025-07-17 DIAGNOSIS — M54.16 LUMBAR RADICULOPATHY: Primary | ICD-10-CM

## 2025-07-17 PROCEDURE — 99203 OFFICE O/P NEW LOW 30 MIN: CPT | Performed by: STUDENT IN AN ORGANIZED HEALTH CARE EDUCATION/TRAINING PROGRAM

## 2025-07-17 PROCEDURE — 1125F AMNT PAIN NOTED PAIN PRSNT: CPT | Performed by: STUDENT IN AN ORGANIZED HEALTH CARE EDUCATION/TRAINING PROGRAM

## 2025-07-17 PROCEDURE — 1160F RVW MEDS BY RX/DR IN RCRD: CPT | Performed by: STUDENT IN AN ORGANIZED HEALTH CARE EDUCATION/TRAINING PROGRAM

## 2025-07-17 PROCEDURE — 3075F SYST BP GE 130 - 139MM HG: CPT | Performed by: STUDENT IN AN ORGANIZED HEALTH CARE EDUCATION/TRAINING PROGRAM

## 2025-07-17 PROCEDURE — G2211 COMPLEX E/M VISIT ADD ON: HCPCS | Performed by: STUDENT IN AN ORGANIZED HEALTH CARE EDUCATION/TRAINING PROGRAM

## 2025-07-17 PROCEDURE — 1159F MED LIST DOCD IN RCRD: CPT | Performed by: STUDENT IN AN ORGANIZED HEALTH CARE EDUCATION/TRAINING PROGRAM

## 2025-07-17 PROCEDURE — 3078F DIAST BP <80 MM HG: CPT | Performed by: STUDENT IN AN ORGANIZED HEALTH CARE EDUCATION/TRAINING PROGRAM

## 2025-07-17 RX ORDER — DIAZEPAM 5 MG/1
5 TABLET ORAL ONCE AS NEEDED
OUTPATIENT
Start: 2025-07-17

## 2025-07-17 ASSESSMENT — PATIENT HEALTH QUESTIONNAIRE - PHQ9
2. FEELING DOWN, DEPRESSED OR HOPELESS: NOT AT ALL
SUM OF ALL RESPONSES TO PHQ9 QUESTIONS 1 AND 2: 0
1. LITTLE INTEREST OR PLEASURE IN DOING THINGS: NOT AT ALL

## 2025-07-17 ASSESSMENT — PAIN SCALES - GENERAL: PAINLEVEL_OUTOF10: 5

## 2025-07-17 NOTE — PROGRESS NOTES
Subjective   Patient ID: Luz Elena Bloom is a 79 y.o. female who presents for New Patient Visit and Back Pain (Patient states has lower back pain that radiates down into left knee and sometimes the ankle.).  HPI    Ms Luz Elena Bloom is a 79-year-old female who presents as a new patient visit with complaints of lower back.  Radicular symptoms primarily on the right side.  She has a past medical history of diabetes.    The patient presents with back pain radiating to the RIGHT lower extremity, described as sharp, burning, or electric in nature. Pain is associated with numbness, tingling, and weakness in the right posterior lateral thigh down to the level of the calf.      Symptoms have been present for approximately 4 years, and are worsened by movements, particularly walking or standing.  She is able to find some relief when she lays. The patient reports relief with rest and over-the-counter medications, though symptoms have persisted. No recent trauma reported. Denies bowel or bladder incontinence, gait instability, or systemic symptoms such as fever or weight loss.    This patient has previously attempted conservative measures, including over-the-counter medications such as NSAIDs, Tylenol, and physical therapy in the remote past.      OARRS:  No data recorded  I have personally reviewed the OARRS report for Luz Elena Bloom. I have considered the risks of abuse, dependence, addiction and diversion    Office Agreement:      Review of Systems     Current Outpatient Medications   Medication Instructions    blood sugar diagnostic (FreeStyle Lite Strips) strip 100 strips, miscellaneous, Daily    cholecalciferol (Vitamin D-3) 50 mcg (2,000 unit) capsule 1 capsule, Daily    ciclopirox (Penlac) 8 % solution Topical, Nightly    cyanocobalamin, vitamin B-12, (VITAMIN B-12 ORAL) Vitamin B-12 TABS   Refills: 0       Active    FreeStyle Lancets 28 gauge USE ONCE DAILY AS DIRECTED    FREESTYLE LANCETS MISC USE TO TEST ONCE DAILY     levothyroxine (SYNTHROID, LEVOXYL) 50 mcg, oral, Daily before breakfast    losartan (COZAAR) 50 mg, oral, Daily    metFORMIN (GLUCOPHAGE) 1,000 mg, oral, Daily with breakfast    multivitamin tablet 1 tablet, Daily    rosuvastatin (CRESTOR) 5 mg, oral, Daily    terbinafine (LAMISIL) 250 mg, Daily        Medical History[1]     Surgical History[2]     Family History[3]     RX Allergies[4]     No results found for this or any previous visit from the past 1000 days.      Objective     Vitals:    07/17/25 1048   BP: 137/69   Pulse: 60   Resp: 15   SpO2: 97%      Pain Score:   5        Physical Exam    GENERAL EXAM  Vital Signs: Vital signs to include heart rate, respiration rate, blood pressure, and temperature were reviewed.  General Appearance:  Awake, alert, healthy appearing, well developed, No acute distress.  Head: Normocephalic without evidence of head injury.  Neck: The appearance of the neck was normal without swelling with a midline trachea.  Eyes: The eyelids and eyebrows exhibited no abnormalities.  Pupils were not pin-point.  Sclera was without icterus.  Lungs: Respiration rhythm and depth was normal.  Respiratory movements were normal without labored breathing.  Cardiovascular: No peripheral edema was present.    Neurological: Patient was oriented to time, place, and person.  Speech was normal.  Balance, gait, and stance were unremarkable.    Psychiatric: Appearance was normal with appropriate dress.  Mood was euthymic and affect was normal.  Skin: Affected regions were without ecchymosis or skin lesions.    Physical exam as above except:            Assessment/Plan   Problem List Items Addressed This Visit    None    ASSESSMENT  Ms Luz Elena Bloom is a 79-year-old female who presents as a new patient visit with complaints of lower back.  Radicular symptoms primarily on the right side.  She has a past medical history of diabetes.    The patient presents with back pain radiating to the RIGHT lower extremity,  described as sharp, burning, or electric in nature. Pain is associated with numbness, tingling, and weakness in the right posterior lateral thigh down to the level of the calf.      Symptoms have been present for approximately 4 years, and are worsened by movements, particularly walking or standing.  She is able to find some relief when she lays. The patient reports relief with rest and over-the-counter medications, though symptoms have persisted. No recent trauma reported. Denies bowel or bladder incontinence, gait instability, or systemic symptoms such as fever or weight loss.    This patient has previously attempted conservative measures, including over-the-counter medications such as NSAIDs, Tylenol, and physical therapy in the remote past.    [id number] XRAY L SPINE  Moderate disc space narrowing osteophytosis at L4-L5     Physical Exam:  Muscle strength 5 out of 5  Reflexes 2+  Pinprick sensation decreased in right lower extremity at level of the ankle in all dermatomal levels compared to the left    Previous Interventions, date, and resultant relief:  None    Diagnosis  Lumbar stenosis with radicular symptoms    PLAN  -The patient had a performed L4-L5 interlaminar epidural steroid injection.  Her x-ray findings demonstrate spondylosis at this level she has dermatomal findings to support this.  She is not currently on blood thinner medication.  Respites, alternatives were discussed patient agreed to proceed.  Should she not have adequate pain relief an MRI of her lumbar spine would be necessary to delineate the cause of her pain.         This note was generated with the aid of dictation software, there may be typos despite my attempts at proofreading.          [1]   Past Medical History:  Diagnosis Date    Asymptomatic menopausal state     Menopause    Cough, unspecified 2013    Cough    Hemorrhoid     Other conditions influencing health status     Menstruation    Other conditions influencing  health status     History of normal mammogram    Personal history of other diseases of the circulatory system     History of varicose veins    Personal history of other diseases of the female genital tract     Vaginal delivery    Personal history of other diseases of the respiratory system 03/12/2013    Personal history of sinusitis    Personal history of other medical treatment     History of bone scan    Personal history of other specified conditions     History of heartburn    Residual hemorrhoidal skin tags 05/05/2014    External hemorrhoids    Unspecified osteoarthritis, unspecified site 07/10/2014    Arthritis    Unspecified symptoms and signs involving the genitourinary system     Urinary symptom or sign    Vitamin B12 deficiency anemia due to intrinsic factor deficiency 01/15/2014    Vitamin B12 deficiency anemia due to intrinsic factor deficiency    Vitamin D deficiency, unspecified 08/17/2022    Vitamin D deficiency   [2]   Past Surgical History:  Procedure Laterality Date    BREAST SURGERY Left 02/28/2013    Breast Surgery    COLONOSCOPY  08/17/2016    Complete Colonoscopy    ESOPHAGOGASTRODUODENOSCOPY  03/07/2021    Diagnostic Esophagogastroduodenoscopy    ESOPHAGOGASTRODUODENOSCOPY  03/07/2021    Diagnostic Esophagogastroduodenoscopy    HERNIA REPAIR  03/04/2013    Hernia Repair    LYMPH NODE BIOPSY  01/13/2014    Biopsy Lymph Node    LYMPHADENECTOMY  03/04/2013    Lymphadenectomy    OTHER SURGICAL HISTORY  03/04/2013    Ovarian Cystectomy    OTHER SURGICAL HISTORY  01/13/2014    Gastric Biopsy   [3]   Family History  Problem Relation Name Age of Onset    Other (Malignant Neoplasm of Uterus) Mother      Stroke Mother      Ovarian cancer Mother      Leukemia Sister      Breast cancer Sister      Cancer Other      Hypertension Other     [4]   Allergies  Allergen Reactions    Penicillins Unknown    Citalopram Other     Chest Tightness    Azithromycin Unknown    Clarithromycin Unknown    Escitalopram  Headache

## 2025-08-01 ENCOUNTER — HOSPITAL ENCOUNTER (OUTPATIENT)
Dept: PAIN MEDICINE | Facility: HOSPITAL | Age: 80
Discharge: HOME | End: 2025-08-01
Payer: MEDICARE

## 2025-08-01 VITALS
BODY MASS INDEX: 26.43 KG/M2 | DIASTOLIC BLOOD PRESSURE: 51 MMHG | WEIGHT: 140 LBS | HEIGHT: 61 IN | SYSTOLIC BLOOD PRESSURE: 168 MMHG | TEMPERATURE: 97.2 F | OXYGEN SATURATION: 97 % | RESPIRATION RATE: 17 BRPM | HEART RATE: 47 BPM

## 2025-08-01 DIAGNOSIS — M54.16 LUMBAR RADICULOPATHY: ICD-10-CM

## 2025-08-01 LAB — GLUCOSE BLD MANUAL STRIP-MCNC: 174 MG/DL (ref 74–99)

## 2025-08-01 PROCEDURE — 7100000009 HC PHASE TWO TIME - INITIAL BASE CHARGE

## 2025-08-01 PROCEDURE — 2500000004 HC RX 250 GENERAL PHARMACY W/ HCPCS (ALT 636 FOR OP/ED): Mod: JZ | Performed by: STUDENT IN AN ORGANIZED HEALTH CARE EDUCATION/TRAINING PROGRAM

## 2025-08-01 PROCEDURE — 82947 ASSAY GLUCOSE BLOOD QUANT: CPT

## 2025-08-01 PROCEDURE — 62323 NJX INTERLAMINAR LMBR/SAC: CPT | Performed by: STUDENT IN AN ORGANIZED HEALTH CARE EDUCATION/TRAINING PROGRAM

## 2025-08-01 PROCEDURE — 7100000010 HC PHASE TWO TIME - EACH INCREMENTAL 1 MINUTE

## 2025-08-01 PROCEDURE — 2550000001 HC RX 255 CONTRASTS: Performed by: STUDENT IN AN ORGANIZED HEALTH CARE EDUCATION/TRAINING PROGRAM

## 2025-08-01 RX ORDER — LIDOCAINE HYDROCHLORIDE 5 MG/ML
INJECTION, SOLUTION INFILTRATION; INTRAVENOUS AS NEEDED
Status: COMPLETED | OUTPATIENT
Start: 2025-08-01 | End: 2025-08-01

## 2025-08-01 RX ORDER — METHYLPREDNISOLONE ACETATE 40 MG/ML
INJECTION, SUSPENSION INTRA-ARTICULAR; INTRALESIONAL; INTRAMUSCULAR; SOFT TISSUE AS NEEDED
Status: COMPLETED | OUTPATIENT
Start: 2025-08-01 | End: 2025-08-01

## 2025-08-01 RX ORDER — LIDOCAINE HYDROCHLORIDE 10 MG/ML
INJECTION, SOLUTION EPIDURAL; INFILTRATION; INTRACAUDAL; PERINEURAL AS NEEDED
Status: COMPLETED | OUTPATIENT
Start: 2025-08-01 | End: 2025-08-01

## 2025-08-01 RX ADMIN — IOHEXOL 3 ML: 240 INJECTION, SOLUTION INTRATHECAL; INTRAVASCULAR; INTRAVENOUS; ORAL at 11:35

## 2025-08-01 RX ADMIN — LIDOCAINE HYDROCHLORIDE 4 ML: 10 INJECTION, SOLUTION EPIDURAL; INFILTRATION; INTRACAUDAL; PERINEURAL at 11:35

## 2025-08-01 RX ADMIN — LIDOCAINE HYDROCHLORIDE 5 ML: 5 INJECTION, SOLUTION INFILTRATION at 11:35

## 2025-08-01 RX ADMIN — METHYLPREDNISOLONE ACETATE 40 MG: 40 INJECTION, SUSPENSION INTRA-ARTICULAR; INTRALESIONAL; INTRAMUSCULAR; SOFT TISSUE at 11:35

## 2025-08-01 ASSESSMENT — COLUMBIA-SUICIDE SEVERITY RATING SCALE - C-SSRS
2. HAVE YOU ACTUALLY HAD ANY THOUGHTS OF KILLING YOURSELF?: NO
6. HAVE YOU EVER DONE ANYTHING, STARTED TO DO ANYTHING, OR PREPARED TO DO ANYTHING TO END YOUR LIFE?: NO
1. IN THE PAST MONTH, HAVE YOU WISHED YOU WERE DEAD OR WISHED YOU COULD GO TO SLEEP AND NOT WAKE UP?: NO

## 2025-08-01 NOTE — DISCHARGE INSTRUCTIONS
Discharge Instructions:   ° Keep Band-Aid on for the next 24 hours.    ° No showering/bathing for the next 24 hours.    ° You may notice soreness or increased pain in the area of your injection, which may continue for the first 48 hours.    ° You should resume any medications and your regular diet after the procedure.  ° You may resume regular daily activity but should avoid strenuous activity the day of the procedure.  Some of the side affects you may experience from the steroids are:  ° Insomnia (inability to sleep)  ° Increased sweating  ° Headaches  ° Increased fluid retention (swelling of your extremities)  ° Increase appetite  ° Face flushing  ° If you are a diabetic, your blood sugars may go up.  Closely monitor your diet.  Your blood sugar should return to normal in a few days.  Complications:   ° Complications are rare with the most common being temporary increase pain near the injection site. You can apply ice to affected area on the day of the procedure.   ° If the discomfort persists, apply moist heat to the area. Serious complications are very uncommon but may include bleeding, infection or nerve damage.   ° If severe pain, fever, redness or swelling near the injection site, have someone take you to the nearest emergency room to be evaluated for procedure complications or infection.  Expectations:   ° Local anesthetics wear off in several hours but duration of relief varies from individual to individual.     If you have any questions, please call the office at [phone].    If this is an emergency, call [phone] or go to your nearest hospital.

## 2025-08-21 ENCOUNTER — APPOINTMENT (OUTPATIENT)
Facility: CLINIC | Age: 80
End: 2025-08-21
Payer: MEDICARE

## 2025-08-21 VITALS
RESPIRATION RATE: 16 BRPM | DIASTOLIC BLOOD PRESSURE: 87 MMHG | BODY MASS INDEX: 26.24 KG/M2 | HEIGHT: 61 IN | WEIGHT: 139 LBS | HEART RATE: 53 BPM | SYSTOLIC BLOOD PRESSURE: 125 MMHG | TEMPERATURE: 97.3 F | OXYGEN SATURATION: 97 %

## 2025-08-21 DIAGNOSIS — M54.16 LUMBAR RADICULOPATHY: Primary | ICD-10-CM

## 2025-08-21 PROCEDURE — G2211 COMPLEX E/M VISIT ADD ON: HCPCS | Performed by: STUDENT IN AN ORGANIZED HEALTH CARE EDUCATION/TRAINING PROGRAM

## 2025-08-21 PROCEDURE — 1159F MED LIST DOCD IN RCRD: CPT | Performed by: STUDENT IN AN ORGANIZED HEALTH CARE EDUCATION/TRAINING PROGRAM

## 2025-08-21 PROCEDURE — 99213 OFFICE O/P EST LOW 20 MIN: CPT | Performed by: STUDENT IN AN ORGANIZED HEALTH CARE EDUCATION/TRAINING PROGRAM

## 2025-08-21 PROCEDURE — 3079F DIAST BP 80-89 MM HG: CPT | Performed by: STUDENT IN AN ORGANIZED HEALTH CARE EDUCATION/TRAINING PROGRAM

## 2025-08-21 PROCEDURE — 1125F AMNT PAIN NOTED PAIN PRSNT: CPT | Performed by: STUDENT IN AN ORGANIZED HEALTH CARE EDUCATION/TRAINING PROGRAM

## 2025-08-21 PROCEDURE — 3074F SYST BP LT 130 MM HG: CPT | Performed by: STUDENT IN AN ORGANIZED HEALTH CARE EDUCATION/TRAINING PROGRAM

## 2025-08-21 ASSESSMENT — COLUMBIA-SUICIDE SEVERITY RATING SCALE - C-SSRS
6. HAVE YOU EVER DONE ANYTHING, STARTED TO DO ANYTHING, OR PREPARED TO DO ANYTHING TO END YOUR LIFE?: NO
1. IN THE PAST MONTH, HAVE YOU WISHED YOU WERE DEAD OR WISHED YOU COULD GO TO SLEEP AND NOT WAKE UP?: NO
2. HAVE YOU ACTUALLY HAD ANY THOUGHTS OF KILLING YOURSELF?: NO

## 2025-08-21 ASSESSMENT — PAIN SCALES - GENERAL: PAINLEVEL_OUTOF10: 6

## 2025-08-22 ENCOUNTER — HOSPITAL ENCOUNTER (OUTPATIENT)
Dept: RADIOLOGY | Facility: HOSPITAL | Age: 80
Discharge: HOME | End: 2025-08-22
Payer: MEDICARE

## 2025-08-22 DIAGNOSIS — M54.16 LUMBAR RADICULOPATHY: ICD-10-CM

## 2025-08-22 PROCEDURE — 72148 MRI LUMBAR SPINE W/O DYE: CPT

## 2025-09-18 ENCOUNTER — APPOINTMENT (OUTPATIENT)
Dept: PRIMARY CARE | Facility: CLINIC | Age: 80
End: 2025-09-18
Payer: MEDICARE